# Patient Record
Sex: FEMALE | Race: ASIAN | Employment: FULL TIME | URBAN - METROPOLITAN AREA
[De-identification: names, ages, dates, MRNs, and addresses within clinical notes are randomized per-mention and may not be internally consistent; named-entity substitution may affect disease eponyms.]

---

## 2017-01-09 ENCOUNTER — APPOINTMENT (OUTPATIENT)
Dept: HEMATOLOGY/ONCOLOGY | Facility: HOSPITAL | Age: 37
End: 2017-01-09
Attending: INTERNAL MEDICINE
Payer: COMMERCIAL

## 2017-01-10 ENCOUNTER — OFFICE VISIT (OUTPATIENT)
Dept: HEMATOLOGY/ONCOLOGY | Facility: HOSPITAL | Age: 37
End: 2017-01-10
Attending: INTERNAL MEDICINE
Payer: COMMERCIAL

## 2017-01-10 VITALS
BODY MASS INDEX: 20.33 KG/M2 | WEIGHT: 122 LBS | RESPIRATION RATE: 18 BRPM | DIASTOLIC BLOOD PRESSURE: 58 MMHG | TEMPERATURE: 99 F | HEIGHT: 65 IN | SYSTOLIC BLOOD PRESSURE: 86 MMHG | HEART RATE: 76 BPM

## 2017-01-10 DIAGNOSIS — E53.8 VITAMIN B12 DEFICIENCY: ICD-10-CM

## 2017-01-10 DIAGNOSIS — D64.9 ANEMIA, UNSPECIFIED TYPE: Primary | ICD-10-CM

## 2017-01-10 PROCEDURE — 99212 OFFICE O/P EST SF 10 MIN: CPT | Performed by: INTERNAL MEDICINE

## 2017-01-10 PROCEDURE — 99214 OFFICE O/P EST MOD 30 MIN: CPT | Performed by: INTERNAL MEDICINE

## 2017-01-10 NOTE — PROGRESS NOTES
HPI     Ms. Manohar Ugalde is seen in the office for follow up regarding anemia. Pt started B12 injections after the last visit in July 2016. C/O intermittent dizziness. Upon questioning, pt states she has also been diagnosed with hypotension.  States she is very b menses). Disp:  Rfl:    cyanocobalamin 1000 MCG/ML Injection Solution Inject 1,000 mcg into the muscle every 7 days.  Disp:  Rfl:      Allergies:   No Known Allergies    Past Medical History   Diagnosis Date   • Anemia      per NextGen:  \"Anemia, unknown c oropharyngeal exudate. Eyes: Conjunctivae and EOM are normal. Pupils are equal, round, and reactive to light. Neck: Normal range of motion. Neck supple. No JVD present. No tracheal deviation present. No thyromegaly present.    Cardiovascular: Normal rat this or any previous visit (from the past 48 hour(s)). Meds This Visit:        Imaging & Referrals:  None   No orders of the defined types were placed in this encounter.

## 2017-01-11 ENCOUNTER — LAB ENCOUNTER (OUTPATIENT)
Dept: LAB | Age: 37
End: 2017-01-11
Attending: INTERNAL MEDICINE
Payer: COMMERCIAL

## 2017-01-11 DIAGNOSIS — E53.8 VITAMIN B12 DEFICIENCY: ICD-10-CM

## 2017-01-11 DIAGNOSIS — D64.9 ANEMIA, UNSPECIFIED TYPE: ICD-10-CM

## 2017-01-11 LAB
ALBUMIN SERPL BCP-MCNC: 4.1 G/DL (ref 3.5–4.8)
ALBUMIN/GLOB SERPL: 1.3 {RATIO} (ref 1–2)
ALP SERPL-CCNC: 32 U/L (ref 32–100)
ALT SERPL-CCNC: 15 U/L (ref 14–54)
ANION GAP SERPL CALC-SCNC: 7 MMOL/L (ref 0–18)
AST SERPL-CCNC: 14 U/L (ref 15–41)
BILIRUB SERPL-MCNC: 0.9 MG/DL (ref 0.3–1.2)
BUN SERPL-MCNC: 7 MG/DL (ref 8–20)
BUN/CREAT SERPL: 12.5 (ref 10–20)
CALCIUM SERPL-MCNC: 9.2 MG/DL (ref 8.5–10.5)
CHLORIDE SERPL-SCNC: 104 MMOL/L (ref 95–110)
CO2 SERPL-SCNC: 26 MMOL/L (ref 22–32)
CREAT SERPL-MCNC: 0.56 MG/DL (ref 0.5–1.5)
FERRITIN SERPL IA-MCNC: 125 NG/ML (ref 11–307)
FOLATE SERPL-MCNC: 7.4 NG/ML
GLOBULIN PLAS-MCNC: 3.2 G/DL (ref 2.5–3.7)
GLUCOSE SERPL-MCNC: 83 MG/DL (ref 70–99)
OSMOLALITY UR CALC.SUM OF ELEC: 281 MOSM/KG (ref 275–295)
POTASSIUM SERPL-SCNC: 3.6 MMOL/L (ref 3.3–5.1)
PROT SERPL-MCNC: 7.3 G/DL (ref 5.9–8.4)
SODIUM SERPL-SCNC: 137 MMOL/L (ref 136–144)
VIT B12 SERPL-MCNC: 365 PG/ML (ref 181–914)

## 2017-01-11 PROCEDURE — 82746 ASSAY OF FOLIC ACID SERUM: CPT

## 2017-01-11 PROCEDURE — 80053 COMPREHEN METABOLIC PANEL: CPT

## 2017-01-11 PROCEDURE — 82607 VITAMIN B-12: CPT

## 2017-01-11 PROCEDURE — 36415 COLL VENOUS BLD VENIPUNCTURE: CPT

## 2017-01-11 PROCEDURE — 82728 ASSAY OF FERRITIN: CPT

## 2017-01-11 PROCEDURE — 85060 BLOOD SMEAR INTERPRETATION: CPT

## 2017-01-11 PROCEDURE — 85025 COMPLETE CBC W/AUTO DIFF WBC: CPT

## 2017-01-12 LAB
BASOPHILS # BLD: 0 K/UL (ref 0–0.2)
BASOPHILS NFR BLD: 1 %
EOSINOPHIL # BLD: 0.2 K/UL (ref 0–0.7)
EOSINOPHIL NFR BLD: 2 %
ERYTHROCYTE [DISTWIDTH] IN BLOOD BY AUTOMATED COUNT: 23.4 % (ref 11–15)
HCT VFR BLD AUTO: 33.1 % (ref 35–48)
HGB BLD-MCNC: 9.5 G/DL (ref 12–16)
LYMPHOCYTES # BLD: 1.9 K/UL (ref 1–4)
LYMPHOCYTES NFR BLD: 26 %
MCH RBC QN AUTO: 16.1 PG (ref 27–32)
MCHC RBC AUTO-ENTMCNC: 28.8 G/DL (ref 32–37)
MCV RBC AUTO: 55.9 FL (ref 80–100)
MONOCYTES # BLD: 0.3 K/UL (ref 0–1)
MONOCYTES NFR BLD: 5 %
NEUTROPHILS # BLD AUTO: 4.8 K/UL (ref 1.8–7.7)
NEUTROPHILS NFR BLD: 66 %
PLATELET # BLD AUTO: 286 K/UL (ref 140–400)
PMV BLD AUTO: 10.3 FL (ref 7.4–10.3)
RBC # BLD AUTO: 5.92 M/UL (ref 3.7–5.4)
WBC # BLD AUTO: 7.2 K/UL (ref 4–11)

## 2017-01-26 ENCOUNTER — TELEPHONE (OUTPATIENT)
Dept: HEMATOLOGY/ONCOLOGY | Facility: HOSPITAL | Age: 37
End: 2017-01-26

## 2017-01-26 NOTE — TELEPHONE ENCOUNTER
Phoned pt at [phone]. She'd had a 1230 appointment scheduled for vitamin B12 injection, every 4 weeks. She said \"I forgot, can I reschedule? \"  Yes - shared the [phone] number with her. Pt states she is feeling ok today. No new issues/concerns.

## 2017-01-31 ENCOUNTER — NURSE ONLY (OUTPATIENT)
Dept: HEMATOLOGY/ONCOLOGY | Facility: HOSPITAL | Age: 37
End: 2017-01-31
Attending: INTERNAL MEDICINE
Payer: COMMERCIAL

## 2017-01-31 VITALS — TEMPERATURE: 99 F | DIASTOLIC BLOOD PRESSURE: 54 MMHG | SYSTOLIC BLOOD PRESSURE: 86 MMHG | HEART RATE: 72 BPM

## 2017-01-31 DIAGNOSIS — D50.8 OTHER IRON DEFICIENCY ANEMIA: ICD-10-CM

## 2017-01-31 DIAGNOSIS — E61.1 IRON DEFICIENCY: ICD-10-CM

## 2017-01-31 DIAGNOSIS — E53.8 VITAMIN B12 DEFICIENCY: Primary | ICD-10-CM

## 2017-01-31 PROCEDURE — 96372 THER/PROPH/DIAG INJ SC/IM: CPT

## 2017-01-31 RX ORDER — CYANOCOBALAMIN 1000 UG/ML
INJECTION INTRAMUSCULAR; SUBCUTANEOUS
Status: DISCONTINUED
Start: 2017-01-31 | End: 2017-01-31

## 2017-01-31 RX ORDER — CYANOCOBALAMIN 1000 UG/ML
1000 INJECTION INTRAMUSCULAR; SUBCUTANEOUS ONCE
Status: COMPLETED | OUTPATIENT
Start: 2017-01-31 | End: 2017-01-31

## 2017-01-31 RX ADMIN — CYANOCOBALAMIN 1000 MCG: 1000 INJECTION INTRAMUSCULAR; SUBCUTANEOUS at 10:56:00

## 2017-01-31 NOTE — PROGRESS NOTES
Patient to infusion for B 12 injection  Patient arrived ambulatory and reports fatigue  She also states that she eats little her appetite and is somewhat poor and she is tired from work .  Patient encouraged to eat smaller meals and increase fluids as well

## 2017-02-28 ENCOUNTER — NURSE ONLY (OUTPATIENT)
Dept: HEMATOLOGY/ONCOLOGY | Facility: HOSPITAL | Age: 37
End: 2017-02-28
Attending: INTERNAL MEDICINE
Payer: COMMERCIAL

## 2017-02-28 VITALS
TEMPERATURE: 99 F | HEART RATE: 57 BPM | SYSTOLIC BLOOD PRESSURE: 89 MMHG | DIASTOLIC BLOOD PRESSURE: 51 MMHG | RESPIRATION RATE: 18 BRPM

## 2017-02-28 DIAGNOSIS — D50.8 OTHER IRON DEFICIENCY ANEMIA: ICD-10-CM

## 2017-02-28 DIAGNOSIS — E53.8 VITAMIN B12 DEFICIENCY: Primary | ICD-10-CM

## 2017-02-28 DIAGNOSIS — E61.1 IRON DEFICIENCY: ICD-10-CM

## 2017-02-28 PROCEDURE — 96372 THER/PROPH/DIAG INJ SC/IM: CPT

## 2017-02-28 RX ORDER — CYANOCOBALAMIN 1000 UG/ML
INJECTION INTRAMUSCULAR; SUBCUTANEOUS
Status: COMPLETED
Start: 2017-02-28 | End: 2017-02-28

## 2017-02-28 RX ORDER — CYANOCOBALAMIN 1000 UG/ML
1000 INJECTION INTRAMUSCULAR; SUBCUTANEOUS ONCE
Status: COMPLETED | OUTPATIENT
Start: 2017-02-28 | End: 2017-02-28

## 2017-02-28 RX ADMIN — CYANOCOBALAMIN: 1000 INJECTION INTRAMUSCULAR; SUBCUTANEOUS at 13:35:00

## 2017-02-28 NOTE — PROGRESS NOTES
Pt here for monthly Vitamin B12 IM. Patient reports fatigue, states is her baseline. Vitamin B12 given IM in left deltoid. Appeared to tolerate treatment well. No S/S of adverse rxn noted at this time.   Patient discharged ambulatory with a future appoi

## 2017-03-29 ENCOUNTER — TELEPHONE (OUTPATIENT)
Dept: HEMATOLOGY/ONCOLOGY | Facility: HOSPITAL | Age: 37
End: 2017-03-29

## 2017-04-11 ENCOUNTER — TELEPHONE (OUTPATIENT)
Dept: HEMATOLOGY/ONCOLOGY | Facility: HOSPITAL | Age: 37
End: 2017-04-11

## 2017-04-11 NOTE — TELEPHONE ENCOUNTER
Pt no showed her injection on 3/28- msg left on 3/29 to call back and reschedule. Spoke with pt on 4/11- she stts she wants to keep her appt that was already scheduled for 4/25. Doesn't want to come in sooner.

## 2017-04-25 ENCOUNTER — NURSE ONLY (OUTPATIENT)
Dept: HEMATOLOGY/ONCOLOGY | Facility: HOSPITAL | Age: 37
End: 2017-04-25
Attending: INTERNAL MEDICINE
Payer: COMMERCIAL

## 2017-04-25 VITALS — SYSTOLIC BLOOD PRESSURE: 91 MMHG | HEART RATE: 66 BPM | DIASTOLIC BLOOD PRESSURE: 49 MMHG | TEMPERATURE: 99 F

## 2017-04-25 DIAGNOSIS — E53.8 VITAMIN B12 DEFICIENCY: Primary | ICD-10-CM

## 2017-04-25 DIAGNOSIS — E61.1 IRON DEFICIENCY: ICD-10-CM

## 2017-04-25 DIAGNOSIS — D50.8 OTHER IRON DEFICIENCY ANEMIA: ICD-10-CM

## 2017-04-25 PROCEDURE — 96372 THER/PROPH/DIAG INJ SC/IM: CPT

## 2017-04-25 RX ORDER — CYANOCOBALAMIN 1000 UG/ML
1000 INJECTION INTRAMUSCULAR; SUBCUTANEOUS ONCE
Status: COMPLETED | OUTPATIENT
Start: 2017-04-25 | End: 2017-04-25

## 2017-04-25 RX ORDER — CYANOCOBALAMIN 1000 UG/ML
INJECTION INTRAMUSCULAR; SUBCUTANEOUS
Status: DISCONTINUED
Start: 2017-04-25 | End: 2017-04-25

## 2017-04-25 RX ADMIN — CYANOCOBALAMIN 1000 MCG: 1000 INJECTION INTRAMUSCULAR; SUBCUTANEOUS at 13:35:00

## 2017-04-25 NOTE — PROGRESS NOTES
Patient to infusion for B 12 injection. Patient has been delayed due to her schedule and forgetting bout her appoitments  Patient arrived ambulatory and offers no complaints at this time   No anika changes voiced. B12 administered and tolerated well.  Band

## 2017-05-18 ENCOUNTER — TELEPHONE (OUTPATIENT)
Dept: HEMATOLOGY/ONCOLOGY | Facility: HOSPITAL | Age: 37
End: 2017-05-18

## 2017-05-18 NOTE — TELEPHONE ENCOUNTER
Called and spoke with patient - scheduled for follow up 5/23/17 at 11:45 with Dr. Eva Bundy. Patient has injection at 1:00 on 5/23/17 and requested that day. Encouraged to call with any questions or concerns.

## 2017-05-23 ENCOUNTER — NURSE ONLY (OUTPATIENT)
Dept: HEMATOLOGY/ONCOLOGY | Facility: HOSPITAL | Age: 37
End: 2017-05-23
Attending: INTERNAL MEDICINE
Payer: COMMERCIAL

## 2017-05-23 VITALS
RESPIRATION RATE: 16 BRPM | BODY MASS INDEX: 20.49 KG/M2 | DIASTOLIC BLOOD PRESSURE: 55 MMHG | HEART RATE: 63 BPM | TEMPERATURE: 99 F | SYSTOLIC BLOOD PRESSURE: 97 MMHG | HEIGHT: 65 IN | WEIGHT: 123 LBS

## 2017-05-23 DIAGNOSIS — E53.8 VITAMIN B12 DEFICIENCY: Primary | ICD-10-CM

## 2017-05-23 DIAGNOSIS — E61.1 IRON DEFICIENCY: ICD-10-CM

## 2017-05-23 DIAGNOSIS — D64.9 ANEMIA, UNSPECIFIED TYPE: ICD-10-CM

## 2017-05-23 DIAGNOSIS — D50.8 OTHER IRON DEFICIENCY ANEMIA: ICD-10-CM

## 2017-05-23 PROCEDURE — 83540 ASSAY OF IRON: CPT

## 2017-05-23 PROCEDURE — 99212 OFFICE O/P EST SF 10 MIN: CPT | Performed by: INTERNAL MEDICINE

## 2017-05-23 PROCEDURE — 96372 THER/PROPH/DIAG INJ SC/IM: CPT

## 2017-05-23 PROCEDURE — 85025 COMPLETE CBC W/AUTO DIFF WBC: CPT

## 2017-05-23 PROCEDURE — 82728 ASSAY OF FERRITIN: CPT

## 2017-05-23 PROCEDURE — 82607 VITAMIN B-12: CPT

## 2017-05-23 PROCEDURE — 83021 HEMOGLOBIN CHROMOTOGRAPHY: CPT

## 2017-05-23 PROCEDURE — 84466 ASSAY OF TRANSFERRIN: CPT

## 2017-05-23 PROCEDURE — 83020 HEMOGLOBIN ELECTROPHORESIS: CPT

## 2017-05-23 PROCEDURE — 99213 OFFICE O/P EST LOW 20 MIN: CPT | Performed by: INTERNAL MEDICINE

## 2017-05-23 PROCEDURE — 82746 ASSAY OF FOLIC ACID SERUM: CPT

## 2017-05-23 PROCEDURE — 36415 COLL VENOUS BLD VENIPUNCTURE: CPT

## 2017-05-23 RX ORDER — CYANOCOBALAMIN 1000 UG/ML
1000 INJECTION INTRAMUSCULAR; SUBCUTANEOUS ONCE
Status: COMPLETED | OUTPATIENT
Start: 2017-05-23 | End: 2017-05-23

## 2017-05-23 RX ORDER — CYANOCOBALAMIN 1000 UG/ML
INJECTION INTRAMUSCULAR; SUBCUTANEOUS
Status: DISCONTINUED
Start: 2017-05-23 | End: 2017-05-23

## 2017-05-23 RX ADMIN — CYANOCOBALAMIN 1000 MCG: 1000 INJECTION INTRAMUSCULAR; SUBCUTANEOUS at 13:30:00

## 2017-05-23 NOTE — PROGRESS NOTES
Cancer Center Progress Note    Patient Name: Rubi Nesbitt   YOB: 1980   Medical Record Number: [id number]   Attending Physician: Fartun Estrada M.D.      Chief Complaint:  Microcytic anemia, B12 deficiency    History of Present Illness:  35-yea Children: N/A     Occupational History  None on file     Social History Main Topics   Smoking status: Never Smoker     Smokeless tobacco: Never Used    Alcohol Use: Yes  0.0 oz/week    0 Standard drinks or equivalent per week         Comment: occasionally 14* 01/11/2017   ALT 15 01/11/2017   ALKPHOS 33 07/14/2016   BILT 0.9 01/11/2017   TP 7.3 01/11/2017   ALB 4.1 01/11/2017   GLOBULIN 3.2 01/11/2017   AGRATIO 1.3 07/14/2016    01/11/2017   K 3.6 01/11/2017    01/11/2017   CO2 26 01/11/2017

## 2017-05-23 NOTE — PROGRESS NOTES
Received patient from Dr Madan Russell visit. Orders received to obtain lab draw today. On 1 attempt #23g butterfly inserted right ac.  Cbc, Y94, Ferriton, Folic Acid and hemoglobinopathy profile obtained and sent to lab. Gauze and Coban to site.   Vitamin B 12 g

## 2017-06-20 ENCOUNTER — NURSE ONLY (OUTPATIENT)
Dept: HEMATOLOGY/ONCOLOGY | Facility: HOSPITAL | Age: 37
End: 2017-06-20
Attending: INTERNAL MEDICINE
Payer: COMMERCIAL

## 2017-06-20 VITALS
DIASTOLIC BLOOD PRESSURE: 50 MMHG | TEMPERATURE: 99 F | HEART RATE: 60 BPM | SYSTOLIC BLOOD PRESSURE: 89 MMHG | RESPIRATION RATE: 16 BRPM

## 2017-06-20 DIAGNOSIS — E61.1 IRON DEFICIENCY: ICD-10-CM

## 2017-06-20 DIAGNOSIS — E53.8 VITAMIN B12 DEFICIENCY: Primary | ICD-10-CM

## 2017-06-20 DIAGNOSIS — D50.8 OTHER IRON DEFICIENCY ANEMIA: ICD-10-CM

## 2017-06-20 PROCEDURE — 96372 THER/PROPH/DIAG INJ SC/IM: CPT

## 2017-06-20 RX ORDER — CYANOCOBALAMIN 1000 UG/ML
1000 INJECTION INTRAMUSCULAR; SUBCUTANEOUS ONCE
Status: COMPLETED | OUTPATIENT
Start: 2017-06-20 | End: 2017-06-20

## 2017-06-20 RX ORDER — CYANOCOBALAMIN 1000 UG/ML
INJECTION INTRAMUSCULAR; SUBCUTANEOUS
Status: COMPLETED
Start: 2017-06-20 | End: 2017-06-20

## 2017-06-20 RX ADMIN — CYANOCOBALAMIN 1000 MCG: 1000 INJECTION INTRAMUSCULAR; SUBCUTANEOUS at 12:30:00

## 2017-06-20 NOTE — PROGRESS NOTES
Patient here for B12 injection. Ambulated from waiting room with steady gait. VS stable,  Injection given IM to left upper arm. Band-aid applied to injection site. Patient escorted to appointment desk to make appointment for 4 weeks.

## 2017-07-20 ENCOUNTER — NURSE ONLY (OUTPATIENT)
Dept: HEMATOLOGY/ONCOLOGY | Facility: HOSPITAL | Age: 37
End: 2017-07-20
Attending: INTERNAL MEDICINE
Payer: COMMERCIAL

## 2017-07-20 DIAGNOSIS — E53.8 VITAMIN B12 DEFICIENCY: Primary | ICD-10-CM

## 2017-07-20 DIAGNOSIS — E61.1 IRON DEFICIENCY: ICD-10-CM

## 2017-07-20 DIAGNOSIS — D50.8 OTHER IRON DEFICIENCY ANEMIA: ICD-10-CM

## 2017-07-20 PROCEDURE — 96372 THER/PROPH/DIAG INJ SC/IM: CPT

## 2017-07-20 RX ORDER — CYANOCOBALAMIN 1000 UG/ML
1000 INJECTION INTRAMUSCULAR; SUBCUTANEOUS ONCE
Status: CANCELLED | OUTPATIENT
Start: 2017-07-20

## 2017-07-20 RX ORDER — CYANOCOBALAMIN 1000 UG/ML
INJECTION INTRAMUSCULAR; SUBCUTANEOUS
Status: COMPLETED
Start: 2017-07-20 | End: 2017-07-20

## 2017-07-20 RX ORDER — CYANOCOBALAMIN 1000 UG/ML
1000 INJECTION INTRAMUSCULAR; SUBCUTANEOUS ONCE
Status: COMPLETED | OUTPATIENT
Start: 2017-07-20 | End: 2017-07-20

## 2017-07-20 RX ADMIN — CYANOCOBALAMIN 1000 MCG: 1000 INJECTION INTRAMUSCULAR; SUBCUTANEOUS at 12:27:00

## 2017-07-20 NOTE — PROGRESS NOTES
Patient arrives for monthly B12. Reports she is feeling well, denies any complaints. B12 injected into left upper arm, patient tolerated well. Patient discharged in stable condition with future appointments scheduled.

## 2017-08-17 ENCOUNTER — NURSE ONLY (OUTPATIENT)
Dept: HEMATOLOGY/ONCOLOGY | Facility: HOSPITAL | Age: 37
End: 2017-08-17
Attending: INTERNAL MEDICINE
Payer: COMMERCIAL

## 2017-08-17 DIAGNOSIS — D50.8 OTHER IRON DEFICIENCY ANEMIA: ICD-10-CM

## 2017-08-17 DIAGNOSIS — E61.1 IRON DEFICIENCY: ICD-10-CM

## 2017-08-17 DIAGNOSIS — E53.8 VITAMIN B12 DEFICIENCY: Primary | ICD-10-CM

## 2017-08-17 PROCEDURE — 96372 THER/PROPH/DIAG INJ SC/IM: CPT

## 2017-08-17 RX ORDER — CYANOCOBALAMIN 1000 UG/ML
INJECTION INTRAMUSCULAR; SUBCUTANEOUS
Status: COMPLETED
Start: 2017-08-17 | End: 2017-08-17

## 2017-08-17 RX ORDER — CYANOCOBALAMIN 1000 UG/ML
1000 INJECTION INTRAMUSCULAR; SUBCUTANEOUS ONCE
Status: CANCELLED | OUTPATIENT
Start: 2017-08-17

## 2017-08-17 RX ORDER — CYANOCOBALAMIN 1000 UG/ML
1000 INJECTION INTRAMUSCULAR; SUBCUTANEOUS ONCE
Status: COMPLETED | OUTPATIENT
Start: 2017-08-17 | End: 2017-08-17

## 2017-08-17 RX ADMIN — CYANOCOBALAMIN 1000 MCG: 1000 INJECTION INTRAMUSCULAR; SUBCUTANEOUS at 12:45:00

## 2017-08-17 NOTE — PROGRESS NOTES
Pt here for monthly Vitamin B12 IM. Patient reports feeling well. States that she was driving the other day et had some dizziness while driving, states she was able to pull over et it resolve in about 2 minutes, states that it hasn't happened since.   Vit

## 2017-09-06 ENCOUNTER — APPOINTMENT (OUTPATIENT)
Dept: HEMATOLOGY/ONCOLOGY | Facility: HOSPITAL | Age: 37
End: 2017-09-06
Attending: INTERNAL MEDICINE
Payer: COMMERCIAL

## 2017-09-08 ENCOUNTER — OFFICE VISIT (OUTPATIENT)
Dept: HEMATOLOGY/ONCOLOGY | Facility: HOSPITAL | Age: 37
End: 2017-09-08
Attending: INTERNAL MEDICINE
Payer: COMMERCIAL

## 2017-09-08 VITALS
WEIGHT: 119 LBS | DIASTOLIC BLOOD PRESSURE: 55 MMHG | SYSTOLIC BLOOD PRESSURE: 91 MMHG | TEMPERATURE: 98 F | BODY MASS INDEX: 19.83 KG/M2 | HEIGHT: 65 IN | HEART RATE: 67 BPM | RESPIRATION RATE: 16 BRPM

## 2017-09-08 DIAGNOSIS — D64.9 ANEMIA, UNSPECIFIED TYPE: ICD-10-CM

## 2017-09-08 DIAGNOSIS — E53.8 VITAMIN B12 DEFICIENCY: Primary | ICD-10-CM

## 2017-09-08 PROCEDURE — 99212 OFFICE O/P EST SF 10 MIN: CPT | Performed by: INTERNAL MEDICINE

## 2017-09-08 PROCEDURE — 99213 OFFICE O/P EST LOW 20 MIN: CPT | Performed by: INTERNAL MEDICINE

## 2017-09-08 RX ORDER — CYANOCOBALAMIN 1000 UG/ML
1000 INJECTION INTRAMUSCULAR; SUBCUTANEOUS ONCE
Status: CANCELLED | OUTPATIENT
Start: 2017-09-14

## 2017-09-08 NOTE — PROGRESS NOTES
Cancer Center Progress Note    Patient Name: Giorgi Griffin   YOB: 1980   Medical Record Number: [id number]   Attending Physician: Susana Gómez M.D.      Chief Complaint:  Microcytic anemia, B12 deficiency    History of Present Illness:  35-yea name: N/A    Years of education: N/A  Number of children: N/A     Occupational History  None on file     Social History Main Topics   Smoking status: Never Smoker    Smokeless tobacco: Never Used    Alcohol use Yes  0.0 oz/week     Comment: occasionally OSMOCALC 281 01/11/2017   ALKPHO 32 01/11/2017   AST 14 (L) 01/11/2017   ALT 15 01/11/2017   ALKPHOS 33 07/14/2016   BILT 0.9 01/11/2017   TP 7.3 01/11/2017   ALB 4.1 01/11/2017   GLOBULIN 3.2 01/11/2017   AGRATIO 1.3 07/14/2016    01/11/2017   K 3

## 2017-09-14 ENCOUNTER — NURSE ONLY (OUTPATIENT)
Dept: HEMATOLOGY/ONCOLOGY | Facility: HOSPITAL | Age: 37
End: 2017-09-14
Attending: INTERNAL MEDICINE
Payer: COMMERCIAL

## 2017-09-14 VITALS
DIASTOLIC BLOOD PRESSURE: 53 MMHG | TEMPERATURE: 99 F | HEART RATE: 60 BPM | RESPIRATION RATE: 16 BRPM | SYSTOLIC BLOOD PRESSURE: 90 MMHG

## 2017-09-14 DIAGNOSIS — D64.9 ANEMIA, UNSPECIFIED TYPE: ICD-10-CM

## 2017-09-14 DIAGNOSIS — E61.1 IRON DEFICIENCY: ICD-10-CM

## 2017-09-14 DIAGNOSIS — E53.8 VITAMIN B12 DEFICIENCY: Primary | ICD-10-CM

## 2017-09-14 LAB
BASOPHILS # BLD: 0 K/UL (ref 0–0.2)
BASOPHILS NFR BLD: 0 %
EOSINOPHIL # BLD: 0.1 K/UL (ref 0–0.7)
EOSINOPHIL NFR BLD: 2 %
ERYTHROCYTE [DISTWIDTH] IN BLOOD BY AUTOMATED COUNT: 22.8 % (ref 11–15)
HCT VFR BLD AUTO: 30.5 % (ref 35–48)
HGB BLD-MCNC: 8.9 G/DL (ref 12–16)
LYMPHOCYTES # BLD: 1.6 K/UL (ref 1–4)
LYMPHOCYTES NFR BLD: 28 %
MCH RBC QN AUTO: 16.2 PG (ref 27–32)
MCHC RBC AUTO-ENTMCNC: 29.2 G/DL (ref 32–37)
MCV RBC AUTO: 55.3 FL (ref 80–100)
MONOCYTES # BLD: 0.3 K/UL (ref 0–1)
MONOCYTES NFR BLD: 6 %
NEUTROPHILS # BLD AUTO: 3.6 K/UL (ref 1.8–7.7)
NEUTROPHILS NFR BLD: 64 %
PLATELET # BLD AUTO: 238 K/UL (ref 140–400)
PMV BLD AUTO: 9.6 FL (ref 7.4–10.3)
RBC # BLD AUTO: 5.52 M/UL (ref 3.7–5.4)
VIT B12 SERPL-MCNC: >1500 PG/ML (ref 181–914)
WBC # BLD AUTO: 5.7 K/UL (ref 4–11)

## 2017-09-14 PROCEDURE — 36415 COLL VENOUS BLD VENIPUNCTURE: CPT

## 2017-09-14 PROCEDURE — 96372 THER/PROPH/DIAG INJ SC/IM: CPT

## 2017-09-14 PROCEDURE — 82607 VITAMIN B-12: CPT

## 2017-09-14 PROCEDURE — 85025 COMPLETE CBC W/AUTO DIFF WBC: CPT

## 2017-09-14 RX ORDER — CYANOCOBALAMIN 1000 UG/ML
1000 INJECTION INTRAMUSCULAR; SUBCUTANEOUS ONCE
Status: DISCONTINUED | OUTPATIENT
Start: 2017-09-14 | End: 2017-09-14

## 2017-09-14 RX ORDER — CYANOCOBALAMIN 1000 UG/ML
1000 INJECTION INTRAMUSCULAR; SUBCUTANEOUS ONCE
Status: CANCELLED | OUTPATIENT
Start: 2017-09-14

## 2017-09-14 RX ORDER — CYANOCOBALAMIN 1000 UG/ML
INJECTION INTRAMUSCULAR; SUBCUTANEOUS
Status: DISCONTINUED
Start: 2017-09-14 | End: 2017-09-14

## 2017-09-14 NOTE — PROGRESS NOTES
Patient here for Vitamin B 12 injection. Patient requested Lab draw, saw Dr Kimberly Gar on 9/8/17. Lab orders noted in order inquiry. On 1 attempt #23g butterfly inserted right ac.  Cbc,b12 obtained and sent. Gauze and coban to site.     B12 given IM in left de

## 2017-10-12 ENCOUNTER — NURSE ONLY (OUTPATIENT)
Dept: HEMATOLOGY/ONCOLOGY | Facility: HOSPITAL | Age: 37
End: 2017-10-12
Attending: INTERNAL MEDICINE
Payer: COMMERCIAL

## 2017-10-12 VITALS
DIASTOLIC BLOOD PRESSURE: 57 MMHG | TEMPERATURE: 99 F | SYSTOLIC BLOOD PRESSURE: 89 MMHG | RESPIRATION RATE: 16 BRPM | HEART RATE: 56 BPM

## 2017-10-12 DIAGNOSIS — D64.9 ANEMIA, UNSPECIFIED TYPE: ICD-10-CM

## 2017-10-12 DIAGNOSIS — E61.1 IRON DEFICIENCY: ICD-10-CM

## 2017-10-12 DIAGNOSIS — E53.8 VITAMIN B12 DEFICIENCY: Primary | ICD-10-CM

## 2017-10-12 PROCEDURE — 96372 THER/PROPH/DIAG INJ SC/IM: CPT

## 2017-10-12 RX ORDER — CYANOCOBALAMIN 1000 UG/ML
1000 INJECTION INTRAMUSCULAR; SUBCUTANEOUS ONCE
Status: COMPLETED | OUTPATIENT
Start: 2017-10-12 | End: 2017-10-12

## 2017-10-12 RX ORDER — CYANOCOBALAMIN 1000 UG/ML
1000 INJECTION INTRAMUSCULAR; SUBCUTANEOUS ONCE
Status: CANCELLED | OUTPATIENT
Start: 2017-10-12

## 2017-10-12 RX ORDER — CYANOCOBALAMIN 1000 UG/ML
INJECTION INTRAMUSCULAR; SUBCUTANEOUS
Status: COMPLETED
Start: 2017-10-12 | End: 2017-10-12

## 2017-10-12 RX ADMIN — CYANOCOBALAMIN 1000 MCG: 1000 INJECTION INTRAMUSCULAR; SUBCUTANEOUS at 12:25:00

## 2017-10-12 NOTE — PROGRESS NOTES
Patient arrives for  B12 injection, per Dr. Dain Powers order, Vitamin B12 is ordered weekly times 4, first week given today, patient is scheduled weekly for next three weeks and then changing back to monthly. Reports she is feeling well, complaints of fatigue.

## 2017-10-20 ENCOUNTER — NURSE ONLY (OUTPATIENT)
Dept: HEMATOLOGY/ONCOLOGY | Facility: HOSPITAL | Age: 37
End: 2017-10-20
Attending: INTERNAL MEDICINE
Payer: COMMERCIAL

## 2017-10-20 VITALS — TEMPERATURE: 98 F | HEART RATE: 67 BPM | SYSTOLIC BLOOD PRESSURE: 94 MMHG | DIASTOLIC BLOOD PRESSURE: 57 MMHG

## 2017-10-20 DIAGNOSIS — E53.8 VITAMIN B12 DEFICIENCY: Primary | ICD-10-CM

## 2017-10-20 DIAGNOSIS — D64.9 ANEMIA, UNSPECIFIED TYPE: ICD-10-CM

## 2017-10-20 DIAGNOSIS — E61.1 IRON DEFICIENCY: ICD-10-CM

## 2017-10-20 PROCEDURE — 96372 THER/PROPH/DIAG INJ SC/IM: CPT

## 2017-10-20 RX ORDER — CYANOCOBALAMIN 1000 UG/ML
1000 INJECTION INTRAMUSCULAR; SUBCUTANEOUS ONCE
Status: CANCELLED | OUTPATIENT
Start: 2017-10-20

## 2017-10-20 RX ORDER — CYANOCOBALAMIN 1000 UG/ML
1000 INJECTION INTRAMUSCULAR; SUBCUTANEOUS ONCE
Status: COMPLETED | OUTPATIENT
Start: 2017-10-20 | End: 2017-10-20

## 2017-10-20 RX ORDER — CYANOCOBALAMIN 1000 UG/ML
INJECTION INTRAMUSCULAR; SUBCUTANEOUS
Status: COMPLETED
Start: 2017-10-20 | End: 2017-10-20

## 2017-10-20 RX ADMIN — CYANOCOBALAMIN 1000 MCG: 1000 INJECTION INTRAMUSCULAR; SUBCUTANEOUS at 14:45:00

## 2017-10-20 NOTE — PROGRESS NOTES
Patient here for B12 injection. Ambulated from waiting room with steady gait. Patient states she is having occasional dizziness when getting up from sitting position.   Usually in the evening around 8 pm discussed her fluid intake discussed if she was drin

## 2017-10-25 ENCOUNTER — TELEPHONE (OUTPATIENT)
Dept: HEMATOLOGY/ONCOLOGY | Facility: HOSPITAL | Age: 37
End: 2017-10-25

## 2017-10-26 ENCOUNTER — APPOINTMENT (OUTPATIENT)
Dept: HEMATOLOGY/ONCOLOGY | Facility: HOSPITAL | Age: 37
End: 2017-10-26
Attending: FAMILY MEDICINE
Payer: COMMERCIAL

## 2017-10-27 ENCOUNTER — TELEPHONE (OUTPATIENT)
Dept: HEMATOLOGY/ONCOLOGY | Facility: HOSPITAL | Age: 37
End: 2017-10-27

## 2017-10-27 ENCOUNTER — APPOINTMENT (OUTPATIENT)
Dept: HEMATOLOGY/ONCOLOGY | Facility: HOSPITAL | Age: 37
End: 2017-10-27
Attending: INTERNAL MEDICINE
Payer: COMMERCIAL

## 2017-10-27 NOTE — TELEPHONE ENCOUNTER
Sugey Sommer per referral from RN [address], [phone] regarding patient's questions/concerns r/t insurance.     ROXANNE FigueroaW

## 2017-11-03 ENCOUNTER — APPOINTMENT (OUTPATIENT)
Dept: HEMATOLOGY/ONCOLOGY | Facility: HOSPITAL | Age: 37
End: 2017-11-03
Attending: INTERNAL MEDICINE
Payer: COMMERCIAL

## 2017-11-14 ENCOUNTER — TELEPHONE (OUTPATIENT)
Dept: HEMATOLOGY/ONCOLOGY | Facility: HOSPITAL | Age: 37
End: 2017-11-14

## 2017-11-14 NOTE — TELEPHONE ENCOUNTER
ZAKI LVMTCB, reaching out per referral from RN regarding Insurance concern. Second attempt.      Reyes Churn, LCSW

## 2017-11-24 ENCOUNTER — APPOINTMENT (OUTPATIENT)
Dept: HEMATOLOGY/ONCOLOGY | Facility: HOSPITAL | Age: 37
End: 2017-11-24
Attending: INTERNAL MEDICINE
Payer: COMMERCIAL

## 2017-12-01 ENCOUNTER — APPOINTMENT (OUTPATIENT)
Dept: HEMATOLOGY/ONCOLOGY | Facility: HOSPITAL | Age: 37
End: 2017-12-01
Attending: INTERNAL MEDICINE
Payer: COMMERCIAL

## 2018-02-22 ENCOUNTER — APPOINTMENT (OUTPATIENT)
Dept: GENERAL RADIOLOGY | Facility: HOSPITAL | Age: 38
End: 2018-02-22
Payer: COMMERCIAL

## 2018-02-22 ENCOUNTER — HOSPITAL ENCOUNTER (EMERGENCY)
Facility: HOSPITAL | Age: 38
Discharge: HOME | End: 2018-02-22
Payer: COMMERCIAL

## 2018-02-22 ENCOUNTER — NURSE TRIAGE (OUTPATIENT)
Dept: OTHER | Age: 38
End: 2018-02-22

## 2018-02-22 ENCOUNTER — APPOINTMENT (OUTPATIENT)
Dept: CT IMAGING | Facility: HOSPITAL | Age: 38
End: 2018-02-22
Attending: NURSE PRACTITIONER
Payer: COMMERCIAL

## 2018-02-22 VITALS
SYSTOLIC BLOOD PRESSURE: 89 MMHG | HEART RATE: 65 BPM | HEIGHT: 66 IN | WEIGHT: 118 LBS | RESPIRATION RATE: 16 BRPM | TEMPERATURE: 100 F | OXYGEN SATURATION: 99 % | BODY MASS INDEX: 18.96 KG/M2 | DIASTOLIC BLOOD PRESSURE: 64 MMHG

## 2018-02-22 DIAGNOSIS — R07.89 CHEST PAIN, NON-CARDIAC: Primary | ICD-10-CM

## 2018-02-22 LAB
ANION GAP SERPL CALC-SCNC: 6 MMOL/L (ref 0–18)
B-HCG UR QL: NEGATIVE
BACTERIA UR QL AUTO: NEGATIVE /HPF
BASOPHILS # BLD: 0 K/UL (ref 0–0.2)
BASOPHILS NFR BLD: 0 %
BILIRUB UR QL: NEGATIVE
BUN SERPL-MCNC: 6 MG/DL (ref 8–20)
BUN/CREAT SERPL: 11.1 (ref 10–20)
CALCIUM SERPL-MCNC: 9 MG/DL (ref 8.5–10.5)
CHLORIDE SERPL-SCNC: 107 MMOL/L (ref 95–110)
CLARITY UR: CLEAR
CO2 SERPL-SCNC: 25 MMOL/L (ref 22–32)
COLOR UR: YELLOW
CREAT SERPL-MCNC: 0.54 MG/DL (ref 0.5–1.5)
D DIMER PPP FEU-MCNC: 0.34 MCG/ML (ref ?–0.5)
EOSINOPHIL # BLD: 0.1 K/UL (ref 0–0.7)
EOSINOPHIL NFR BLD: 2 %
ERYTHROCYTE [DISTWIDTH] IN BLOOD BY AUTOMATED COUNT: 23 % (ref 11–15)
GLUCOSE SERPL-MCNC: 98 MG/DL (ref 70–99)
GLUCOSE UR-MCNC: NEGATIVE MG/DL
HCT VFR BLD AUTO: 29.6 % (ref 35–48)
HGB BLD-MCNC: 9.3 G/DL (ref 12–16)
KETONES UR-MCNC: NEGATIVE MG/DL
LEUKOCYTE ESTERASE UR QL STRIP.AUTO: NEGATIVE
LYMPHOCYTES # BLD: 1.5 K/UL (ref 1–4)
LYMPHOCYTES NFR BLD: 24 %
MCH RBC QN AUTO: 17.5 PG (ref 27–32)
MCHC RBC AUTO-ENTMCNC: 31.4 G/DL (ref 32–37)
MCV RBC AUTO: 55.8 FL (ref 80–100)
MONOCYTES # BLD: 0.3 K/UL (ref 0–1)
MONOCYTES NFR BLD: 6 %
NEUTROPHILS # BLD AUTO: 4.1 K/UL (ref 1.8–7.7)
NEUTROPHILS NFR BLD: 68 %
NITRITE UR QL STRIP.AUTO: NEGATIVE
OSMOLALITY UR CALC.SUM OF ELEC: 284 MOSM/KG (ref 275–295)
PH UR: 7 [PH] (ref 5–8)
PLATELET # BLD AUTO: 228 K/UL (ref 140–400)
PMV BLD AUTO: 9.2 FL (ref 7.4–10.3)
POTASSIUM SERPL-SCNC: 3 MMOL/L (ref 3.3–5.1)
PROT UR-MCNC: NEGATIVE MG/DL
RBC # BLD AUTO: 5.3 M/UL (ref 3.7–5.4)
RBC #/AREA URNS AUTO: 2 /HPF
SODIUM SERPL-SCNC: 138 MMOL/L (ref 136–144)
SP GR UR STRIP: 1.01 (ref 1–1.03)
TROPONIN I SERPL-MCNC: 0 NG/ML (ref ?–0.03)
UROBILINOGEN UR STRIP-ACNC: <2
VIT C UR-MCNC: NEGATIVE MG/DL
WBC # BLD AUTO: 6 K/UL (ref 4–11)
WBC #/AREA URNS AUTO: <1 /HPF

## 2018-02-22 PROCEDURE — 81001 URINALYSIS AUTO W/SCOPE: CPT

## 2018-02-22 PROCEDURE — 71260 CT THORAX DX C+: CPT | Performed by: NURSE PRACTITIONER

## 2018-02-22 PROCEDURE — 96374 THER/PROPH/DIAG INJ IV PUSH: CPT

## 2018-02-22 PROCEDURE — 99285 EMERGENCY DEPT VISIT HI MDM: CPT

## 2018-02-22 PROCEDURE — 96375 TX/PRO/DX INJ NEW DRUG ADDON: CPT

## 2018-02-22 PROCEDURE — 80048 BASIC METABOLIC PNL TOTAL CA: CPT

## 2018-02-22 PROCEDURE — 71045 X-RAY EXAM CHEST 1 VIEW: CPT

## 2018-02-22 PROCEDURE — 84484 ASSAY OF TROPONIN QUANT: CPT

## 2018-02-22 PROCEDURE — 93010 ELECTROCARDIOGRAM REPORT: CPT | Performed by: INTERNAL MEDICINE

## 2018-02-22 PROCEDURE — 85025 COMPLETE CBC W/AUTO DIFF WBC: CPT

## 2018-02-22 PROCEDURE — 81025 URINE PREGNANCY TEST: CPT

## 2018-02-22 PROCEDURE — 93005 ELECTROCARDIOGRAM TRACING: CPT

## 2018-02-22 PROCEDURE — 85379 FIBRIN DEGRADATION QUANT: CPT | Performed by: NURSE PRACTITIONER

## 2018-02-22 RX ORDER — MORPHINE SULFATE 2 MG/ML
2 INJECTION, SOLUTION INTRAMUSCULAR; INTRAVENOUS ONCE
Status: COMPLETED | OUTPATIENT
Start: 2018-02-22 | End: 2018-02-22

## 2018-02-22 RX ORDER — KETOROLAC TROMETHAMINE 30 MG/ML
30 INJECTION, SOLUTION INTRAMUSCULAR; INTRAVENOUS ONCE
Status: COMPLETED | OUTPATIENT
Start: 2018-02-22 | End: 2018-02-22

## 2018-02-22 NOTE — ED PROVIDER NOTES
Patient Seen in: [facility] Emergency Department    History   CC: sob  HPI: Tracie Guardado 41year old female  who presents to the ER c/o midsternal chest pain as well as shortness of breath that is intermittent for the past approximately 1 month systems reviewed and negative except as noted above. PSFH elements reviewed from today and agreed except as otherwise stated in HPI. Medications :   Naproxen Sodium (ALEVE) 220 MG Oral Cap,  Take 220 mg by mouth as needed.    ibuprofen 100 MG/5ML Oral following:        Result Value    Potassium 3.0 (*)     BUN 6 (*)     All other components within normal limits   URINALYSIS WITH CULTURE REFLEX - Abnormal; Notable for the following:     Blood Urine Small (*)     All other components within normal limits Patient demonstrates understanding of all instruction and agrees with plan of care.       Disposition and Plan     Clinical Impression:  Chest pain, non-cardiac  (primary encounter diagnosis)    Disposition:  Discharge    Follow-up:  Lelo Her,

## 2018-02-22 NOTE — TELEPHONE ENCOUNTER
Action Requested: Summary for Provider     []  Critical Lab, Recommendations Needed  [] Need Additional Advice  [x]   FYI    []   Need Orders  [] Need Medications Sent to Pharmacy  []  Other     SUMMARY: on and off sharp chest pain left side of chest, shor

## 2018-02-22 NOTE — ED NOTES
Patient arrives with complaints of CP and SOB for the past month, and worsening symptoms today. Patient states CP radiates to left shoulder and down arm. Per pt, CP increases with deep breaths, but CP is always constant. Denies N/V/D/F.  Denies recent trave

## 2018-02-22 NOTE — ED INITIAL ASSESSMENT (HPI)
Pt presents with SOB and left-sided chest pain that radiates to the back x1 month. Pt states pain has worsened today. Took Advil with no relief. Denies N/V. Pain worse with deep inhalation.

## 2018-02-26 NOTE — TELEPHONE ENCOUNTER
Patient contacted. ER f/u:  chart reviewed with the patient; CT scan and CXR neg ;patient is to f/u with PCP for f/u appt. Patient states she still has some shoulder pain; Also noticed a clear fluid filled blister on sole of foot.  Feels some pain with wa

## 2018-02-27 ENCOUNTER — TELEPHONE (OUTPATIENT)
Dept: FAMILY MEDICINE CLINIC | Facility: CLINIC | Age: 38
End: 2018-02-27

## 2018-02-27 ENCOUNTER — OFFICE VISIT (OUTPATIENT)
Dept: FAMILY MEDICINE CLINIC | Facility: CLINIC | Age: 38
End: 2018-02-27

## 2018-02-27 VITALS
DIASTOLIC BLOOD PRESSURE: 62 MMHG | SYSTOLIC BLOOD PRESSURE: 91 MMHG | BODY MASS INDEX: 19.99 KG/M2 | HEART RATE: 76 BPM | HEIGHT: 65 IN | WEIGHT: 120 LBS

## 2018-02-27 DIAGNOSIS — K13.0 ANGULAR CHEILITIS: ICD-10-CM

## 2018-02-27 DIAGNOSIS — S29.019A THORACIC MYOFASCIAL STRAIN, INITIAL ENCOUNTER: ICD-10-CM

## 2018-02-27 DIAGNOSIS — B07.0 PLANTAR WART: Primary | ICD-10-CM

## 2018-02-27 PROCEDURE — 99213 OFFICE O/P EST LOW 20 MIN: CPT | Performed by: FAMILY MEDICINE

## 2018-02-27 PROCEDURE — 99212 OFFICE O/P EST SF 10 MIN: CPT | Performed by: FAMILY MEDICINE

## 2018-02-27 RX ORDER — CLOTRIMAZOLE 1 %
1 CREAM (GRAM) TOPICAL 2 TIMES DAILY
Qty: 1 TUBE | Refills: 1 | Status: SHIPPED | OUTPATIENT
Start: 2018-02-27 | End: 2018-08-01

## 2018-02-27 NOTE — TELEPHONE ENCOUNTER
Called patient  And states that she saw Dr Michaela Dumas earlier and was about to leave when she asked him about any medication that she can use for her mouth sore, advised that will send the message to MD and  Will call her back, verbalized understanding.     Yamini Read

## 2018-02-27 NOTE — PROGRESS NOTES
Blood pressure 91/62, pulse 76, height 5' 5\" (1.651 m), weight 120 lb (54.4 kg). Patient presents today complaining of a one-week history of mouth sores on both sides. She also complains of a lump on the bottom of her foot.   Also with some shoulder pa

## 2018-02-27 NOTE — PATIENT INSTRUCTIONS
Use PUMISCE STONE EVERY NIGHT SHAVE WART AREA    APPLY SALICYLIC ACID 17 % AFTER PUMISCE STONE (COMPOUND W OR DUOFILM)

## 2018-02-27 NOTE — TELEPHONE ENCOUNTER
Pt called to clarify which medication Dr. Yasmeen Hobbs mentioned for blisters for her mouth. She is at the pharmacy and knows that there was a script sent over, but states that there was another cream but cannot remember the name and is awaiting instructions.

## 2018-02-28 NOTE — TELEPHONE ENCOUNTER
Called patient and advised about the rx that was send, patient getting confused with the medications, advised that the clotrimazole cream is for the mouth sore and the compound W is for the foot, verbalized understanding.

## 2018-03-06 ENCOUNTER — OFFICE VISIT (OUTPATIENT)
Dept: FAMILY MEDICINE CLINIC | Facility: CLINIC | Age: 38
End: 2018-03-06

## 2018-03-06 VITALS
HEART RATE: 68 BPM | HEIGHT: 65 IN | BODY MASS INDEX: 20.16 KG/M2 | DIASTOLIC BLOOD PRESSURE: 68 MMHG | WEIGHT: 121 LBS | SYSTOLIC BLOOD PRESSURE: 95 MMHG

## 2018-03-06 DIAGNOSIS — S29.019A THORACIC MYOFASCIAL STRAIN, INITIAL ENCOUNTER: Primary | ICD-10-CM

## 2018-03-06 PROCEDURE — 99212 OFFICE O/P EST SF 10 MIN: CPT | Performed by: FAMILY MEDICINE

## 2018-03-06 PROCEDURE — 99213 OFFICE O/P EST LOW 20 MIN: CPT | Performed by: FAMILY MEDICINE

## 2018-03-06 RX ORDER — NAPROXEN 500 MG/1
500 TABLET ORAL 2 TIMES DAILY WITH MEALS
Qty: 60 TABLET | Refills: 1 | Status: SHIPPED | OUTPATIENT
Start: 2018-03-06 | End: 2018-08-01

## 2018-03-14 NOTE — PROGRESS NOTES
Blood pressure 95/68, pulse 68, height 5' 5\" (1.651 m), weight 121 lb (54.9 kg). Patient presents today complaining of continued left shoulder pain did have some relief with manipulation previously.   She also reports that she has not had any traumatic

## 2018-08-01 ENCOUNTER — HOSPITAL ENCOUNTER (OUTPATIENT)
Age: 38
Discharge: HOME | End: 2018-08-01
Attending: EMERGENCY MEDICINE
Payer: COMMERCIAL

## 2018-08-01 ENCOUNTER — NURSE TRIAGE (OUTPATIENT)
Dept: FAMILY MEDICINE CLINIC | Facility: CLINIC | Age: 38
End: 2018-08-01

## 2018-08-01 VITALS
WEIGHT: 118 LBS | RESPIRATION RATE: 20 BRPM | BODY MASS INDEX: 18.96 KG/M2 | HEIGHT: 66 IN | OXYGEN SATURATION: 100 % | TEMPERATURE: 98 F | SYSTOLIC BLOOD PRESSURE: 99 MMHG | HEART RATE: 64 BPM | DIASTOLIC BLOOD PRESSURE: 57 MMHG

## 2018-08-01 DIAGNOSIS — M43.6 TORTICOLLIS: Primary | ICD-10-CM

## 2018-08-01 PROCEDURE — 99213 OFFICE O/P EST LOW 20 MIN: CPT

## 2018-08-01 PROCEDURE — 99214 OFFICE O/P EST MOD 30 MIN: CPT

## 2018-08-01 RX ORDER — MELOXICAM 7.5 MG/1
TABLET ORAL
Qty: 10 TABLET | Refills: 0 | Status: SHIPPED | OUTPATIENT
Start: 2018-08-01 | End: 2018-08-17

## 2018-08-01 RX ORDER — METAXALONE 800 MG/1
800 TABLET ORAL EVERY 8 HOURS PRN
Qty: 20 TABLET | Refills: 0 | Status: SHIPPED | OUTPATIENT
Start: 2018-08-01 | End: 2018-08-17

## 2018-08-01 NOTE — ED PROVIDER NOTES
Patient Seen in: [facility]    History   Patient presents with:  Back Pain (musculoskeletal)    Stated Complaint: left upper back pain    HPI    The patient is a 16-year-old female with a history of anemia, torticollis ×1 All other systems reviewed and negative except as noted above.     Physical Exam   ED Triage Vitals [08/01/18 1541]  BP: 99/57  Pulse: 64  Resp: 20  Temp: 98.4 °F (36.9 °C)  Temp src: Oral  SpO2: 100 %  O2 Device: n/a    Current:BP 99/57   Pulse 64   T 8 (eight) hours as needed for Pain., Normal, Disp-20 tablet, R-0

## 2018-08-01 NOTE — TELEPHONE ENCOUNTER
Action Requested: Summary for Provider     []  Critical Lab, Recommendations Needed  [] Need Additional Advice  [x]   FYI    []   Need Orders  [] Need Medications Sent to Pharmacy  []  Other     SUMMARY: Patient advised go to ER/IC due to back and neck dao

## 2018-08-01 NOTE — ED INITIAL ASSESSMENT (HPI)
States she had sudden onset of left thoracic back pain after reaching with her right arm. Stiff, slow movements. Hard to take a deep breath. Tender to palpate. +CMS to extremities. Some nausea when pain is severe.

## 2018-08-17 ENCOUNTER — OFFICE VISIT (OUTPATIENT)
Dept: FAMILY MEDICINE CLINIC | Facility: CLINIC | Age: 38
End: 2018-08-17
Payer: COMMERCIAL

## 2018-08-17 VITALS
WEIGHT: 121 LBS | DIASTOLIC BLOOD PRESSURE: 58 MMHG | BODY MASS INDEX: 19.44 KG/M2 | SYSTOLIC BLOOD PRESSURE: 93 MMHG | HEIGHT: 66 IN | TEMPERATURE: 98 F | RESPIRATION RATE: 16 BRPM | HEART RATE: 55 BPM

## 2018-08-17 DIAGNOSIS — R04.0 EPISTAXIS: Primary | ICD-10-CM

## 2018-08-17 DIAGNOSIS — M43.6 TORTICOLLIS: ICD-10-CM

## 2018-08-17 PROCEDURE — 99212 OFFICE O/P EST SF 10 MIN: CPT | Performed by: FAMILY MEDICINE

## 2018-08-17 PROCEDURE — 99213 OFFICE O/P EST LOW 20 MIN: CPT | Performed by: FAMILY MEDICINE

## 2018-08-17 RX ORDER — MELOXICAM 7.5 MG/1
TABLET ORAL
Qty: 30 TABLET | Refills: 0 | Status: SHIPPED | OUTPATIENT
Start: 2018-08-17 | End: 2019-04-19

## 2018-08-17 NOTE — PROGRESS NOTES
Blood pressure 93/58, pulse 55, temperature 98.3 °F (36.8 °C), temperature source Oral, resp. rate 16, height 5' 6\" (1.676 m), weight 121 lb (54.9 kg), last menstrual period 07/28/2018. Following up for office visit for torticollis.   She reports that s

## 2019-03-05 ENCOUNTER — OFFICE VISIT (OUTPATIENT)
Dept: FAMILY MEDICINE CLINIC | Facility: CLINIC | Age: 39
End: 2019-03-05
Payer: COMMERCIAL

## 2019-03-05 ENCOUNTER — HOSPITAL ENCOUNTER (OUTPATIENT)
Dept: GENERAL RADIOLOGY | Age: 39
Discharge: HOME | End: 2019-03-05
Attending: FAMILY MEDICINE
Payer: COMMERCIAL

## 2019-03-05 VITALS
DIASTOLIC BLOOD PRESSURE: 61 MMHG | BODY MASS INDEX: 19.15 KG/M2 | HEIGHT: 66 IN | HEART RATE: 59 BPM | SYSTOLIC BLOOD PRESSURE: 95 MMHG | WEIGHT: 119.13 LBS

## 2019-03-05 DIAGNOSIS — R22.31 MASS OF HAND, RIGHT: Primary | ICD-10-CM

## 2019-03-05 DIAGNOSIS — R22.31 MASS OF HAND, RIGHT: ICD-10-CM

## 2019-03-05 PROCEDURE — 73130 X-RAY EXAM OF HAND: CPT | Performed by: FAMILY MEDICINE

## 2019-03-05 PROCEDURE — 99212 OFFICE O/P EST SF 10 MIN: CPT | Performed by: FAMILY MEDICINE

## 2019-03-05 NOTE — PROGRESS NOTES
Blood pressure 95/61, pulse 59, height 5' 6\" (1.676 m), weight 119 lb 2 oz (54 kg).     Patient presents today complaining of a painful lump on the palmar aspect of her right hand fourth metacarpal area    She reports it is tender to palpation    Objective

## 2019-03-21 ENCOUNTER — OFFICE VISIT (OUTPATIENT)
Dept: SURGERY | Facility: CLINIC | Age: 39
End: 2019-03-21
Payer: COMMERCIAL

## 2019-03-21 DIAGNOSIS — M67.441 GANGLION OF RIGHT HAND: Primary | ICD-10-CM

## 2019-03-21 PROCEDURE — 99243 OFF/OP CNSLTJ NEW/EST LOW 30: CPT | Performed by: PLASTIC SURGERY

## 2019-03-21 PROCEDURE — 99212 OFFICE O/P EST SF 10 MIN: CPT | Performed by: PLASTIC SURGERY

## 2019-03-21 RX ORDER — HYDROCODONE BITARTRATE AND ACETAMINOPHEN 5; 325 MG/1; MG/1
TABLET ORAL
Qty: 10 TABLET | Refills: 0 | Status: SHIPPED | OUTPATIENT
Start: 2019-03-21 | End: 2021-05-04 | Stop reason: ALTCHOICE

## 2019-03-21 NOTE — H&P
Kenyetta Ramírez is a 42year old female that presents with Patient presents with:  Ganglion: Volar RRF metacarpal  .    REFERRED BY:  Cristy Fajardo    Pacemaker: No  Latex Allergy: no  Coumadin: No  Plavix: No  Other anticoagulants: No  Cardiac stents: N Pregnancy 2012    per NextGen:  \"Pregnancy; Management:   (10-);  Provider:   Justin Larose; Comments:  APGAR:  9 (1 min), 9 (5 min) (10 min) - 2nd degree perineal laceration\"     Past Surgical History:   Procedure Laterality Date   •  Not Asked        Stress Concern: Not Asked        Weight Concern: Not Asked        Special Diet: Not Asked        Back Care: Not Asked        Exercise: Not Asked        Bike Helmet: Not Asked        Seat Belt: Not Asked        Self-Exams: Not Asked    Soci therapy, will have significant impact on the final result, and could lead to permanent pain, stiffness, weakness, and loss of function. Even with satisfactory healing, the hand/digit may not regain normal ROM or normal function.     She will be off work

## 2019-03-26 ENCOUNTER — TELEPHONE (OUTPATIENT)
Dept: SURGERY | Facility: CLINIC | Age: 39
End: 2019-03-26

## 2019-03-26 DIAGNOSIS — M67.441 GANGLION OF RIGHT HAND: Primary | ICD-10-CM

## 2019-04-16 ENCOUNTER — TELEPHONE (OUTPATIENT)
Dept: SURGERY | Facility: CLINIC | Age: 39
End: 2019-04-16

## 2019-04-16 NOTE — H&P
Salbador Lesches is a 42year old female that presents with Patient presents with:  Ganglion: Volar RRF metacarpal  .    REFERRED BY:  Emely Blanco    Pacemaker: No  Latex Allergy: no  Coumadin: No  Plavix: No  Other anticoagulants: No  Cardiac stents: N 6 lb(s) 12 oz Female\"   • Pregnancy     per NextGen:  \"Pregnancy; Management:  induced ; Outcome/detail:  Unknown sex\"   • Pregnancy     per NextGen:  \"Pregnancy; Management:   (10-);  Provider:   Navjot Mcneil; Comments:  JASBIR Not Asked        Caffeine Concern: No        Occupational Exposure: Not Asked        Hobby Hazards: Not Asked        Sleep Concern: Not Asked        Stress Concern: Not Asked        Weight Concern: Not Asked        Special Diet: Not Asked        Back Care: level is critical.  Therapy will be necessary post-operatively.   Failure to comply with post-operative instructions, including therapy, will have significant impact on the final result, and could lead to permanent pain, stiffness, weakness, and loss of fun

## 2019-04-16 NOTE — TELEPHONE ENCOUNTER
Spoke with patient. All changes to medications and allergies, per patient report, have been documented in the medical record.      Patient  has not been ill, hospitalized, or had any surgical procedures since last seen in our office on 3/21/19    Dr. Chandu Garcia

## 2019-04-19 RX ORDER — CETIRIZINE HYDROCHLORIDE 10 MG/1
10 TABLET ORAL DAILY PRN
COMMUNITY
End: 2021-05-04 | Stop reason: ALTCHOICE

## 2019-04-19 RX ORDER — FAMOTIDINE 20 MG/1
20 TABLET ORAL ONCE
Status: CANCELLED | OUTPATIENT
Start: 2019-04-19 | End: 2019-04-19

## 2019-04-19 RX ORDER — LORATADINE 10 MG/1
10 TABLET ORAL DAILY
COMMUNITY
End: 2021-05-04 | Stop reason: ALTCHOICE

## 2019-04-19 RX ORDER — METOCLOPRAMIDE 10 MG/1
10 TABLET ORAL ONCE
Status: CANCELLED | OUTPATIENT
Start: 2019-04-19 | End: 2019-04-19

## 2019-04-26 ENCOUNTER — HOSPITAL ENCOUNTER (OUTPATIENT)
Facility: HOSPITAL | Age: 39
Setting detail: HOSPITAL OUTPATIENT SURGERY
Discharge: HOME | End: 2019-04-26
Attending: PLASTIC SURGERY | Admitting: PLASTIC SURGERY
Payer: COMMERCIAL

## 2019-04-26 ENCOUNTER — ANESTHESIA (OUTPATIENT)
Dept: SURGERY | Facility: HOSPITAL | Age: 39
End: 2019-04-26
Payer: COMMERCIAL

## 2019-04-26 ENCOUNTER — ANESTHESIA EVENT (OUTPATIENT)
Dept: SURGERY | Facility: HOSPITAL | Age: 39
End: 2019-04-26
Payer: COMMERCIAL

## 2019-04-26 VITALS
HEIGHT: 65 IN | RESPIRATION RATE: 14 BRPM | SYSTOLIC BLOOD PRESSURE: 90 MMHG | TEMPERATURE: 99 F | DIASTOLIC BLOOD PRESSURE: 55 MMHG | WEIGHT: 117 LBS | OXYGEN SATURATION: 100 % | BODY MASS INDEX: 19.49 KG/M2 | HEART RATE: 53 BPM

## 2019-04-26 DIAGNOSIS — M67.441 GANGLION OF RIGHT HAND: Primary | ICD-10-CM

## 2019-04-26 PROCEDURE — 0LB70ZZ EXCISION OF RIGHT HAND TENDON, OPEN APPROACH: ICD-10-PCS | Performed by: PLASTIC SURGERY

## 2019-04-26 PROCEDURE — 26160 REMOVE TENDON SHEATH LESION: CPT | Performed by: PLASTIC SURGERY

## 2019-04-26 RX ORDER — HYDROCODONE BITARTRATE AND ACETAMINOPHEN 5; 325 MG/1; MG/1
1 TABLET ORAL AS NEEDED
Status: DISCONTINUED | OUTPATIENT
Start: 2019-04-26 | End: 2019-04-26

## 2019-04-26 RX ORDER — SODIUM CHLORIDE, SODIUM LACTATE, POTASSIUM CHLORIDE, CALCIUM CHLORIDE 600; 310; 30; 20 MG/100ML; MG/100ML; MG/100ML; MG/100ML
INJECTION, SOLUTION INTRAVENOUS CONTINUOUS
Status: DISCONTINUED | OUTPATIENT
Start: 2019-04-26 | End: 2019-04-26

## 2019-04-26 RX ORDER — MORPHINE SULFATE 10 MG/ML
6 INJECTION, SOLUTION INTRAMUSCULAR; INTRAVENOUS EVERY 10 MIN PRN
Status: DISCONTINUED | OUTPATIENT
Start: 2019-04-26 | End: 2019-04-26

## 2019-04-26 RX ORDER — NALOXONE HYDROCHLORIDE 0.4 MG/ML
80 INJECTION, SOLUTION INTRAMUSCULAR; INTRAVENOUS; SUBCUTANEOUS AS NEEDED
Status: DISCONTINUED | OUTPATIENT
Start: 2019-04-26 | End: 2019-04-26

## 2019-04-26 RX ORDER — HYDROCODONE BITARTRATE AND ACETAMINOPHEN 5; 325 MG/1; MG/1
2 TABLET ORAL AS NEEDED
Status: DISCONTINUED | OUTPATIENT
Start: 2019-04-26 | End: 2019-04-26

## 2019-04-26 RX ORDER — MORPHINE SULFATE 4 MG/ML
4 INJECTION, SOLUTION INTRAMUSCULAR; INTRAVENOUS EVERY 10 MIN PRN
Status: DISCONTINUED | OUTPATIENT
Start: 2019-04-26 | End: 2019-04-26

## 2019-04-26 RX ORDER — ACETAMINOPHEN 500 MG
1000 TABLET ORAL ONCE
Status: COMPLETED | OUTPATIENT
Start: 2019-04-26 | End: 2019-04-26

## 2019-04-26 RX ORDER — LIDOCAINE HYDROCHLORIDE 10 MG/ML
INJECTION, SOLUTION EPIDURAL; INFILTRATION; INTRACAUDAL; PERINEURAL AS NEEDED
Status: DISCONTINUED | OUTPATIENT
Start: 2019-04-26 | End: 2019-04-26 | Stop reason: HOSPADM

## 2019-04-26 RX ORDER — HYDROCODONE BITARTRATE AND ACETAMINOPHEN 5; 325 MG/1; MG/1
1 TABLET ORAL EVERY 4 HOURS PRN
Status: DISCONTINUED | OUTPATIENT
Start: 2019-04-26 | End: 2019-04-26

## 2019-04-26 RX ORDER — HALOPERIDOL 5 MG/ML
0.25 INJECTION INTRAMUSCULAR ONCE AS NEEDED
Status: DISCONTINUED | OUTPATIENT
Start: 2019-04-26 | End: 2019-04-26

## 2019-04-26 RX ORDER — MORPHINE SULFATE 2 MG/ML
2 INJECTION, SOLUTION INTRAMUSCULAR; INTRAVENOUS EVERY 10 MIN PRN
Status: DISCONTINUED | OUTPATIENT
Start: 2019-04-26 | End: 2019-04-26

## 2019-04-26 RX ORDER — MIDAZOLAM HYDROCHLORIDE 1 MG/ML
INJECTION INTRAMUSCULAR; INTRAVENOUS AS NEEDED
Status: DISCONTINUED | OUTPATIENT
Start: 2019-04-26 | End: 2019-04-26 | Stop reason: SURG

## 2019-04-26 RX ORDER — ONDANSETRON 2 MG/ML
4 INJECTION INTRAMUSCULAR; INTRAVENOUS ONCE AS NEEDED
Status: DISCONTINUED | OUTPATIENT
Start: 2019-04-26 | End: 2019-04-26

## 2019-04-26 RX ORDER — KETOROLAC TROMETHAMINE 30 MG/ML
INJECTION, SOLUTION INTRAMUSCULAR; INTRAVENOUS AS NEEDED
Status: DISCONTINUED | OUTPATIENT
Start: 2019-04-26 | End: 2019-04-26 | Stop reason: SURG

## 2019-04-26 RX ADMIN — KETOROLAC TROMETHAMINE 30 MG: 30 INJECTION, SOLUTION INTRAMUSCULAR; INTRAVENOUS at 09:45:00

## 2019-04-26 RX ADMIN — MIDAZOLAM HYDROCHLORIDE 2 MG: 1 INJECTION INTRAMUSCULAR; INTRAVENOUS at 08:55:00

## 2019-04-26 RX ADMIN — SODIUM CHLORIDE, SODIUM LACTATE, POTASSIUM CHLORIDE, CALCIUM CHLORIDE: 600; 310; 30; 20 INJECTION, SOLUTION INTRAVENOUS at 08:55:00

## 2019-04-26 RX ADMIN — SODIUM CHLORIDE, SODIUM LACTATE, POTASSIUM CHLORIDE, CALCIUM CHLORIDE: 600; 310; 30; 20 INJECTION, SOLUTION INTRAVENOUS at 09:47:00

## 2019-04-26 NOTE — H&P
Pacemaker: No  Latex Allergy: no  Coumadin: No  Plavix: No  Other anticoagulants: No  Cardiac stents: No     HAND DOMINANCE:      Right  Profession: , uses hands frequently     RECONSTRUCTIVE HISTORY     SUN EXPOSURE                Cur \"Pregnancy; Management:  induced ; Outcome/detail:  Unknown sex\"   • Pregnancy      per NextGen:  \"Pregnancy; Management:   (10-);  Provider:   Zac Hernández; Comments:  APGAR:  9 (1 min), 9 (5 min) (10 min) - 2nd degree perinea Occupational Exposure: Not Asked        Hobby Hazards: Not Asked        Sleep Concern: Not Asked        Stress Concern: Not Asked        Weight Concern: Not Asked        Special Diet: Not Asked        Back Care: Not Asked        Exercise: Not Asked will be necessary post-operatively.   Failure to comply with post-operative instructions, including therapy, will have significant impact on the final result, and could lead to permanent pain, stiffness, weakness, and loss of function.     Even with satisfa

## 2019-04-26 NOTE — ANESTHESIA POSTPROCEDURE EVALUATION
Patient: Lydia Lawtonu    Procedure Summary     Date:  04/26/19 Room / Location:  [address] [place] [address] MAIN OR 01 / [address] [facility] MAIN OR    Anesthesia Start:  3008 Anesthesia Stop:  4240    Procedure:  HAND GANGLION EXCISION (Right ) Diagnosis:  (ganglion)    Surgeon:  Elisa Flaherty

## 2019-04-26 NOTE — ANESTHESIA PREPROCEDURE EVALUATION
Anesthesia PreOp Note    HPI:     Aparna Ovalles is a 42year old female who presents for preoperative consultation requested by: Junior George MD    Date of Surgery: 4/26/2019    Procedure(s):  HAND GANGLION EXCISION  Indication: ganglion    Rele \"Pregnancy; Management:  ; Facility:  [facility]; Outcome/detail:  40 week 6 lb(s) 12 oz Female\"   • Pregnancy     per NextGen:  \"Pregnancy; Management:  induced ;  Outcome/detail:  Unknown sex\"   • Pregnancy 2012    per NextGen: Substance and Sexual Activity      Alcohol use:  Yes        Alcohol/week: 0.0 oz        Comment: occasionally      Drug use: No      Sexual activity: Not on file    Lifestyle      Physical activity:        Days per week: Not on file        Minutes per sess and Physical Exam     Patient summary reviewed and Nursing notes reviewed    Airway   Mallampati: I  TM distance: >3 FB  Neck ROM: full  Dental - normal exam     Pulmonary - negative ROS and normal exam   Cardiovascular - negative ROS and normal exam  Exer

## 2019-04-26 NOTE — INTERVAL H&P NOTE
Pre-op Diagnosis: ganglion    The above referenced H&P was reviewed by Marcia Moses MD on 4/26/2019, the patient was examined and no significant changes have occurred in the patient's condition since the H&P was performed.   I discussed with the p

## 2019-04-26 NOTE — BRIEF OP NOTE
Pre-Operative Diagnosis: ganglion     Post-Operative Diagnosis: ganglion      Procedure Performed:   Procedure(s):  excision ganglion right palm    Surgeon(s) and Role:     * Hayes Collins MD - Primary    Assistant(s):        Surgical Findings: Ga

## 2019-04-26 NOTE — OPERATIVE REPORT
[facility]    PATIENT'S NAME: Nito Leo   ATTENDING PHYSICIAN: Alf Crooks MD   OPERATING PHYSICIAN: Alf Crooks MD   PATIENT ACCOUNT#:   [id number]    LOCATION:  [place] [address]  MEDICAL RECORD #:   [id number]

## 2019-04-27 ENCOUNTER — TELEPHONE (OUTPATIENT)
Dept: SURGERY | Facility: CLINIC | Age: 39
End: 2019-04-27

## 2019-04-27 NOTE — TELEPHONE ENCOUNTER
Spoke with pt. Denies pain, numbness, edmea and need for analgesics. Intermittent tingling. Dressing CDI. Wearing sling, with hand above the level of the heart.   Reminded next appointment 4/29/19  Instructed to call the office with any questions and/or

## 2019-04-29 ENCOUNTER — NURSE ONLY (OUTPATIENT)
Dept: SURGERY | Facility: CLINIC | Age: 39
End: 2019-04-29
Payer: COMMERCIAL

## 2019-04-29 DIAGNOSIS — Z48.01 DRESSING CHANGE OR REMOVAL, SURGICAL WOUND: Primary | ICD-10-CM

## 2019-04-29 PROCEDURE — 99212 OFFICE O/P EST SF 10 MIN: CPT | Performed by: PLASTIC SURGERY

## 2019-04-29 NOTE — PROGRESS NOTES
Surgery 1: RRF A1 ganglion  - Date: 04/26/19  - Days Since: 3    [facility] Pt identified w/2 identifiers and orders verified. Pt presents w/ xeroform, gauze, and spandage.   Pt assisted to exam chair and exam chair reclined for

## 2019-04-30 ENCOUNTER — TELEPHONE (OUTPATIENT)
Dept: SURGERY | Facility: CLINIC | Age: 39
End: 2019-04-30

## 2019-04-30 NOTE — TELEPHONE ENCOUNTER
Forms from [facility] for short termed Disability were received via fax. Forms were scanned to KAREN Santos and [facility].  No HIPPA No fee taken

## 2019-05-01 NOTE — TELEPHONE ENCOUNTER
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 Logged for processing. TIFFANIE packet emailed to pt [email or web address]  NK

## 2019-05-06 ENCOUNTER — OFFICE VISIT (OUTPATIENT)
Dept: SURGERY | Facility: CLINIC | Age: 39
End: 2019-05-06
Payer: COMMERCIAL

## 2019-05-06 DIAGNOSIS — M25.641 JOINT STIFFNESS OF HAND, RIGHT: Primary | ICD-10-CM

## 2019-05-06 DIAGNOSIS — M62.81 DISTAL MUSCLE WEAKNESS: ICD-10-CM

## 2019-05-06 PROCEDURE — 97110 THERAPEUTIC EXERCISES: CPT | Performed by: OCCUPATIONAL THERAPIST

## 2019-05-06 PROCEDURE — 97166 OT EVAL MOD COMPLEX 45 MIN: CPT | Performed by: OCCUPATIONAL THERAPIST

## 2019-05-06 NOTE — PROGRESS NOTES
OCCUPATIONAL THERAPY EVALUATION:   Srinivasan Lepe   [id number]       SUBJECTIVE:    HX of Injury: RRF A1 ganglion  Chief Complaint:   Tenderness around the surgical site. .    Precautions: 2 # lifting restriction with the right hand.   Premorbid Functional S be reached in x 2 weeks:    1) Full functional use of the involved extremity for self-care, leisure and work related tasks:  . Patient will be seen 2 x /week for 1 weeks or a total of 2 visits.    Pt. was advised regarding the findings of this evalua

## 2019-05-06 NOTE — TELEPHONE ENCOUNTER
Pt came to appt with Dr Fransico Potter today, signed Sydnee Jose and pd $ 25 fee for [facility] papers. Scanned to TIFFANIE and brought originals to TIFFANIE @ [facility].

## 2019-05-08 NOTE — TELEPHONE ENCOUNTER
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 Logged for processing.  NK

## 2019-05-14 NOTE — TELEPHONE ENCOUNTER
Dr. Hanna Osuna,    2 Forms     Please sign off on form:  -Highlight the patient and hit \"Chart\" button. -In Chart Review, w/in the Encounter tab - click 1 time on the Telephone call encounter for 4/30/19.  Scroll down the telephone encounter.  -Click \"s

## 2019-05-16 NOTE — TELEPHONE ENCOUNTER
Faxed Disab form and RTW to [employer] - [phone]. Mailed copy to pt. Notified via [facility] [address].

## 2020-01-17 ENCOUNTER — TELEPHONE (OUTPATIENT)
Dept: FAMILY MEDICINE CLINIC | Facility: CLINIC | Age: 40
End: 2020-01-17

## 2020-01-17 ENCOUNTER — OFFICE VISIT (OUTPATIENT)
Dept: FAMILY MEDICINE CLINIC | Facility: CLINIC | Age: 40
End: 2020-01-17
Payer: COMMERCIAL

## 2020-01-17 ENCOUNTER — LAB ENCOUNTER (OUTPATIENT)
Dept: LAB | Age: 40
End: 2020-01-17
Attending: FAMILY MEDICINE
Payer: COMMERCIAL

## 2020-01-17 VITALS
SYSTOLIC BLOOD PRESSURE: 101 MMHG | WEIGHT: 119 LBS | HEART RATE: 70 BPM | BODY MASS INDEX: 19.13 KG/M2 | HEIGHT: 66 IN | DIASTOLIC BLOOD PRESSURE: 65 MMHG

## 2020-01-17 DIAGNOSIS — M79.606 PAIN OF LOWER EXTREMITY, UNSPECIFIED LATERALITY: ICD-10-CM

## 2020-01-17 DIAGNOSIS — Z86.2 HISTORY OF ANEMIA: ICD-10-CM

## 2020-01-17 DIAGNOSIS — R35.89 POLYURIA: ICD-10-CM

## 2020-01-17 LAB
ANION GAP SERPL CALC-SCNC: 4 MMOL/L (ref 0–18)
BACTERIA UR QL AUTO: NEGATIVE /HPF
BILIRUB UR QL: NEGATIVE
BUN BLD-MCNC: 6 MG/DL (ref 7–18)
BUN/CREAT SERPL: 10.5 (ref 10–20)
CALCIUM BLD-MCNC: 8.3 MG/DL (ref 8.5–10.1)
CHLORIDE SERPL-SCNC: 108 MMOL/L (ref 98–112)
CLARITY UR: CLEAR
CO2 SERPL-SCNC: 28 MMOL/L (ref 21–32)
COLOR UR: YELLOW
CREAT BLD-MCNC: 0.57 MG/DL (ref 0.55–1.02)
GLUCOSE BLD-MCNC: 86 MG/DL (ref 70–99)
GLUCOSE UR-MCNC: NEGATIVE MG/DL
HCG SERPL QL: NEGATIVE
HYALINE CASTS #/AREA URNS AUTO: 1 /LPF
KETONES UR-MCNC: NEGATIVE MG/DL
LEUKOCYTE ESTERASE UR QL STRIP.AUTO: NEGATIVE
NITRITE UR QL STRIP.AUTO: NEGATIVE
OSMOLALITY SERPL CALC.SUM OF ELEC: 287 MOSM/KG (ref 275–295)
PATIENT FASTING Y/N/NP: YES
PH UR: 7 [PH] (ref 5–8)
POTASSIUM SERPL-SCNC: 3.8 MMOL/L (ref 3.5–5.1)
PROT UR-MCNC: NEGATIVE MG/DL
RBC #/AREA URNS AUTO: 1 /HPF
SODIUM SERPL-SCNC: 140 MMOL/L (ref 136–145)
SP GR UR STRIP: 1.01 (ref 1–1.03)
T4 FREE SERPL-MCNC: 1 NG/DL (ref 0.8–1.7)
TSI SER-ACNC: 3.82 MIU/ML (ref 0.36–3.74)
UROBILINOGEN UR STRIP-ACNC: <2
WBC #/AREA URNS AUTO: 1 /HPF

## 2020-01-17 PROCEDURE — 80048 BASIC METABOLIC PNL TOTAL CA: CPT

## 2020-01-17 PROCEDURE — 99213 OFFICE O/P EST LOW 20 MIN: CPT | Performed by: FAMILY MEDICINE

## 2020-01-17 PROCEDURE — 84443 ASSAY THYROID STIM HORMONE: CPT

## 2020-01-17 PROCEDURE — 85025 COMPLETE CBC W/AUTO DIFF WBC: CPT

## 2020-01-17 PROCEDURE — 84439 ASSAY OF FREE THYROXINE: CPT

## 2020-01-17 PROCEDURE — 81001 URINALYSIS AUTO W/SCOPE: CPT

## 2020-01-17 PROCEDURE — 84703 CHORIONIC GONADOTROPIN ASSAY: CPT

## 2020-01-17 PROCEDURE — 85060 BLOOD SMEAR INTERPRETATION: CPT

## 2020-01-17 PROCEDURE — 36415 COLL VENOUS BLD VENIPUNCTURE: CPT

## 2020-01-17 PROCEDURE — 87086 URINE CULTURE/COLONY COUNT: CPT

## 2020-01-17 NOTE — PROGRESS NOTES
Blood pressure 101/65, pulse 70, height 5' 6\" (1.676 m), weight 119 lb (54 kg). Patient presents today reporting that she has bilateral leg pain that reflects when she felt she had low potassium levels previously. She denies any injuries.   She does ha

## 2020-01-18 ENCOUNTER — TELEPHONE (OUTPATIENT)
Dept: FAMILY MEDICINE CLINIC | Facility: CLINIC | Age: 40
End: 2020-01-18

## 2020-01-18 LAB
BASOPHILS # BLD AUTO: 0.03 X10(3) UL (ref 0–0.2)
BASOPHILS NFR BLD AUTO: 0.6 %
DEPRECATED RDW RBC AUTO: 45.2 FL (ref 35.1–46.3)
EOSINOPHIL # BLD AUTO: 0.12 X10(3) UL (ref 0–0.7)
EOSINOPHIL NFR BLD AUTO: 2.2 %
ERYTHROCYTE [DISTWIDTH] IN BLOOD BY AUTOMATED COUNT: 23.9 % (ref 11–15)
HCT VFR BLD AUTO: 32.8 % (ref 35–48)
HELMET CELLS BLD QL SMEAR: PRESENT
HGB BLD-MCNC: 9.5 G/DL (ref 12–16)
IMM GRANULOCYTES # BLD AUTO: 0.01 X10(3) UL (ref 0–1)
IMM GRANULOCYTES NFR BLD: 0.2 %
LYMPHOCYTES # BLD AUTO: 1.59 X10(3) UL (ref 1–4)
LYMPHOCYTES NFR BLD AUTO: 29.2 %
MCH RBC QN AUTO: 17 PG (ref 26–34)
MCHC RBC AUTO-ENTMCNC: 29 G/DL (ref 31–37)
MCV RBC AUTO: 58.7 FL (ref 80–100)
MONOCYTES # BLD AUTO: 0.29 X10(3) UL (ref 0.1–1)
MONOCYTES NFR BLD AUTO: 5.3 %
NEUTROPHILS # BLD AUTO: 3.4 X10 (3) UL (ref 1.5–7.7)
NEUTROPHILS # BLD AUTO: 3.4 X10(3) UL (ref 1.5–7.7)
NEUTROPHILS NFR BLD AUTO: 62.5 %
PLATELET # BLD AUTO: 211 10(3)UL (ref 150–450)
PLATELET MORPHOLOGY: NORMAL
RBC # BLD AUTO: 5.59 X10(6)UL (ref 3.8–5.3)
WBC # BLD AUTO: 5.4 X10(3) UL (ref 4–11)

## 2020-01-18 NOTE — TELEPHONE ENCOUNTER
Lab paged provider at 6:13 p.m. to inform provider that stat Bhcg was negative. Called informed patient. Patient expressed understanding. Advised to follow-up with  [facility] for further recommendations.

## 2020-01-21 ENCOUNTER — APPOINTMENT (OUTPATIENT)
Dept: LAB | Age: 40
End: 2020-01-21
Attending: FAMILY MEDICINE
Payer: COMMERCIAL

## 2020-01-21 DIAGNOSIS — E03.9 HYPOTHYROIDISM, UNSPECIFIED TYPE: ICD-10-CM

## 2020-01-21 PROCEDURE — 86800 THYROGLOBULIN ANTIBODY: CPT

## 2020-01-21 PROCEDURE — 36415 COLL VENOUS BLD VENIPUNCTURE: CPT

## 2020-01-22 LAB — THYROGLOB SERPL-MCNC: <15 U/ML (ref ?–60)

## 2020-02-10 ENCOUNTER — LAB ENCOUNTER (OUTPATIENT)
Dept: LAB | Facility: HOSPITAL | Age: 40
End: 2020-02-10
Attending: INTERNAL MEDICINE
Payer: COMMERCIAL

## 2020-02-10 ENCOUNTER — OFFICE VISIT (OUTPATIENT)
Dept: HEMATOLOGY/ONCOLOGY | Facility: HOSPITAL | Age: 40
End: 2020-02-10
Attending: INTERNAL MEDICINE
Payer: COMMERCIAL

## 2020-02-10 VITALS
WEIGHT: 122.56 LBS | OXYGEN SATURATION: 100 % | RESPIRATION RATE: 16 BRPM | HEART RATE: 60 BPM | DIASTOLIC BLOOD PRESSURE: 42 MMHG | TEMPERATURE: 98 F | SYSTOLIC BLOOD PRESSURE: 100 MMHG | BODY MASS INDEX: 20 KG/M2

## 2020-02-10 DIAGNOSIS — D50.9 MICROCYTIC ANEMIA: ICD-10-CM

## 2020-02-10 DIAGNOSIS — E53.8 B12 DEFICIENCY: Primary | ICD-10-CM

## 2020-02-10 DIAGNOSIS — E53.8 B12 DEFICIENCY: ICD-10-CM

## 2020-02-10 DIAGNOSIS — D56.9 THALASSEMIA, UNSPECIFIED TYPE: ICD-10-CM

## 2020-02-10 LAB — VIT B12 SERPL-MCNC: 275 PG/ML (ref 193–986)

## 2020-02-10 PROCEDURE — 36415 COLL VENOUS BLD VENIPUNCTURE: CPT

## 2020-02-10 PROCEDURE — 83921 ORGANIC ACID SINGLE QUANT: CPT

## 2020-02-10 PROCEDURE — 99214 OFFICE O/P EST MOD 30 MIN: CPT | Performed by: INTERNAL MEDICINE

## 2020-02-10 PROCEDURE — 82607 VITAMIN B-12: CPT

## 2020-02-10 NOTE — PROGRESS NOTES
Cancer Center Progress Note    Patient Name: Juliet Guardado   YOB: 1980   Medical Record Number: [id number]   Attending Physician: Renetta Alva M.D.      Chief Complaint:  Microcytic anemia, B12 deficiency    History of Present Illness right palm   • HAND GANGLION EXCISION Right 2019    Performed by Cody Simeon MD at [facility] OR   •   ,    • OTHER SURGICAL HISTORY      per NextGen:  \"(3) -induced\"       Family History:  Family History   Problem Relat Occupational Exposure: Not Asked        Hobby Hazards: Not Asked        Sleep Concern: Not Asked        Stress Concern: Not Asked        Weight Concern: Not Asked        Special Diet: Not Asked        Back Care: Not Asked        Exercise: Not Asked ANIONGAP 4 01/17/2020    GFRNAA 118 01/17/2020    GFRAA 136 01/17/2020    CA 8.3 (L) 01/17/2020    OSMOCALC 287 01/17/2020    ALKPHO 32 01/11/2017    AST 14 (L) 01/11/2017    ALT 15 01/11/2017    ALKPHOS 33 07/14/2016    BILT 0.9 01/11/2017    TP 7.3 01/11

## 2020-02-15 LAB — MMA: <0.1 UMOL/L

## 2020-10-06 ENCOUNTER — TELEMEDICINE (OUTPATIENT)
Dept: FAMILY MEDICINE CLINIC | Facility: CLINIC | Age: 40
End: 2020-10-06
Payer: COMMERCIAL

## 2020-10-06 ENCOUNTER — NURSE TRIAGE (OUTPATIENT)
Dept: FAMILY MEDICINE CLINIC | Facility: CLINIC | Age: 40
End: 2020-10-06

## 2020-10-06 DIAGNOSIS — J02.9 PHARYNGITIS, UNSPECIFIED ETIOLOGY: Primary | ICD-10-CM

## 2020-10-06 PROCEDURE — 99213 OFFICE O/P EST LOW 20 MIN: CPT | Performed by: FAMILY MEDICINE

## 2020-10-06 RX ORDER — PENICILLIN V POTASSIUM 500 MG/1
500 TABLET ORAL 3 TIMES DAILY
Qty: 30 TABLET | Refills: 0 | Status: SHIPPED | OUTPATIENT
Start: 2020-10-06 | End: 2021-05-04 | Stop reason: ALTCHOICE

## 2020-10-06 NOTE — PROGRESS NOTES
VIDEO VISIT    1 week history of sore throat. Has dysphagia no dysphonia. .  No nasal congestion or known cold exposure. She denies any coughing. No ear pain or sneezing. Some watery itchy eyes NO FEVER     No vomiting no diarrhea.     Objective patient

## 2020-10-06 NOTE — TELEPHONE ENCOUNTER
Action Requested: Summary for Provider     []  Critical Lab, Recommendations Needed  [] Need Additional Advice  []   FYI    []   Need Orders  [] Need Medications Sent to Pharmacy  []  Other     SUMMARY: Patient reports sore throat for 1 week, has pain with

## 2020-10-07 ENCOUNTER — APPOINTMENT (OUTPATIENT)
Dept: LAB | Age: 40
End: 2020-10-07
Attending: FAMILY MEDICINE
Payer: COMMERCIAL

## 2020-10-07 DIAGNOSIS — J02.9 PHARYNGITIS, UNSPECIFIED ETIOLOGY: ICD-10-CM

## 2021-02-16 ENCOUNTER — OFFICE VISIT (OUTPATIENT)
Dept: HEMATOLOGY/ONCOLOGY | Facility: HOSPITAL | Age: 41
End: 2021-02-16
Attending: INTERNAL MEDICINE
Payer: COMMERCIAL

## 2021-02-16 VITALS
TEMPERATURE: 99 F | BODY MASS INDEX: 19.61 KG/M2 | DIASTOLIC BLOOD PRESSURE: 63 MMHG | HEART RATE: 84 BPM | OXYGEN SATURATION: 98 % | RESPIRATION RATE: 16 BRPM | SYSTOLIC BLOOD PRESSURE: 93 MMHG | WEIGHT: 122 LBS | HEIGHT: 66 IN

## 2021-02-16 DIAGNOSIS — D50.9 MICROCYTIC ANEMIA: ICD-10-CM

## 2021-02-16 DIAGNOSIS — D56.9 THALASSEMIA, UNSPECIFIED TYPE: ICD-10-CM

## 2021-02-16 DIAGNOSIS — E53.8 B12 DEFICIENCY: Primary | ICD-10-CM

## 2021-02-16 LAB
BASOPHILS # BLD AUTO: 0.02 X10(3) UL (ref 0–0.2)
BASOPHILS NFR BLD AUTO: 0.4 %
DEPRECATED HBV CORE AB SER IA-ACNC: 93.9 NG/ML
DEPRECATED RDW RBC AUTO: 46.9 FL (ref 35.1–46.3)
EOSINOPHIL # BLD AUTO: 0.11 X10(3) UL (ref 0–0.7)
EOSINOPHIL NFR BLD AUTO: 2.4 %
ERYTHROCYTE [DISTWIDTH] IN BLOOD BY AUTOMATED COUNT: 25 % (ref 11–15)
HCT VFR BLD AUTO: 31.8 %
HGB BLD-MCNC: 9.4 G/DL
IMM GRANULOCYTES # BLD AUTO: 0.01 X10(3) UL (ref 0–1)
IMM GRANULOCYTES NFR BLD: 0.2 %
IRON SATURATION: 43 %
IRON SERPL-MCNC: 113 UG/DL
LYMPHOCYTES # BLD AUTO: 1.36 X10(3) UL (ref 1–4)
LYMPHOCYTES NFR BLD AUTO: 29.4 %
MCH RBC QN AUTO: 17.2 PG (ref 26–34)
MCHC RBC AUTO-ENTMCNC: 29.6 G/DL (ref 31–37)
MCV RBC AUTO: 58 FL
MONOCYTES # BLD AUTO: 0.25 X10(3) UL (ref 0.1–1)
MONOCYTES NFR BLD AUTO: 5.4 %
NEUTROPHILS # BLD AUTO: 2.88 X10 (3) UL (ref 1.5–7.7)
NEUTROPHILS # BLD AUTO: 2.88 X10(3) UL (ref 1.5–7.7)
NEUTROPHILS NFR BLD AUTO: 62.2 %
PLATELET # BLD AUTO: 198 10(3)UL (ref 150–450)
PLATELET MORPHOLOGY: NORMAL
RBC # BLD AUTO: 5.48 X10(6)UL
TOTAL IRON BINDING CAPACITY: 265 UG/DL (ref 240–450)
TRANSFERRIN SERPL-MCNC: 178 MG/DL (ref 200–360)
VIT B12 SERPL-MCNC: 242 PG/ML (ref 193–986)
WBC # BLD AUTO: 4.6 X10(3) UL (ref 4–11)

## 2021-02-16 PROCEDURE — 36415 COLL VENOUS BLD VENIPUNCTURE: CPT

## 2021-02-16 PROCEDURE — 99214 OFFICE O/P EST MOD 30 MIN: CPT | Performed by: INTERNAL MEDICINE

## 2021-02-16 NOTE — PROGRESS NOTES
Cancer Center Progress Note    Patient Name: Cora Guardado   YOB: 1980   Medical Record Number: [id number]   Attending Physician: Chinyere Winston M.D.      Chief Complaint:  Microcytic anemia, B12 deficiency    History of Present Illness right palm   • HAND GANGLION EXCISION Right 2019    Performed by Michaela Chavira MD at [facility] OR   •   ,    • OTHER SURGICAL HISTORY      per NextGen:  \"(3) -induced\"       Family History:  Family History   Problem Relat Exposure: Not Asked        Hobby Hazards: Not Asked        Sleep Concern: Not Asked        Stress Concern: Not Asked        Weight Concern: Not Asked        Special Diet: Not Asked        Back Care: Not Asked        Exercise: Not Asked        Bike Helmet: Laboratory:  No results for input(s): WBC, HGB, PLT, NE, NEUT in the last 504 hours.       Lab Results   Component Value Date    GLU 86 01/17/2020    BUN 6 (L) 01/17/2020    BUNCREA 10.5 01/17/2020    CREATSERUM 0.57 01/17/2020    ANIONGAP 4 01/17/202

## 2021-02-17 ENCOUNTER — TELEPHONE (OUTPATIENT)
Dept: HEMATOLOGY/ONCOLOGY | Facility: HOSPITAL | Age: 41
End: 2021-02-17

## 2021-02-17 NOTE — TELEPHONE ENCOUNTER
Called patient w results of labs which were drawn after MD visit yesterday. Dr Sky Gallo recommends she have repeat labs and MDV in 5 months, and that she stop taking iron (she is currently taking 1-2 iron tabs per week.) Follow up for September scheduled.  She

## 2021-02-19 LAB — MMA: 0.1 UMOL/L

## 2021-03-18 ENCOUNTER — TELEPHONE (OUTPATIENT)
Dept: FAMILY MEDICINE CLINIC | Facility: CLINIC | Age: 41
End: 2021-03-18

## 2021-03-18 NOTE — TELEPHONE ENCOUNTER
Patient states she may be able to receive the Covid vaccination through her work as early as this Saturday, and asking if ok to take.

## 2021-05-03 ENCOUNTER — TELEPHONE (OUTPATIENT)
Dept: HEMATOLOGY/ONCOLOGY | Facility: HOSPITAL | Age: 41
End: 2021-05-03

## 2021-05-03 ENCOUNTER — HOSPITAL ENCOUNTER (OUTPATIENT)
Age: 41
Discharge: ANOTHER HOSPITAL | End: 2021-05-03
Payer: COMMERCIAL

## 2021-05-03 ENCOUNTER — HOSPITAL ENCOUNTER (EMERGENCY)
Facility: HOSPITAL | Age: 41
Discharge: LEFT BEFORE DISCHARGE | End: 2021-05-03
Payer: COMMERCIAL

## 2021-05-03 VITALS
SYSTOLIC BLOOD PRESSURE: 126 MMHG | HEART RATE: 62 BPM | OXYGEN SATURATION: 100 % | DIASTOLIC BLOOD PRESSURE: 69 MMHG | RESPIRATION RATE: 18 BRPM | TEMPERATURE: 98 F

## 2021-05-03 VITALS
TEMPERATURE: 98 F | SYSTOLIC BLOOD PRESSURE: 106 MMHG | DIASTOLIC BLOOD PRESSURE: 69 MMHG | HEART RATE: 67 BPM | RESPIRATION RATE: 20 BRPM | WEIGHT: 122 LBS | BODY MASS INDEX: 19.61 KG/M2 | OXYGEN SATURATION: 99 % | HEIGHT: 66 IN

## 2021-05-03 DIAGNOSIS — H53.9 VISION CHANGES: Primary | ICD-10-CM

## 2021-05-03 DIAGNOSIS — R42 DIZZINESS: ICD-10-CM

## 2021-05-03 PROCEDURE — 93000 ELECTROCARDIOGRAM COMPLETE: CPT | Performed by: NURSE PRACTITIONER

## 2021-05-03 PROCEDURE — 99213 OFFICE O/P EST LOW 20 MIN: CPT | Performed by: NURSE PRACTITIONER

## 2021-05-03 NOTE — ED INITIAL ASSESSMENT (HPI)
C/o dizziness. States this is a chronic problem, but had a syncopal episode last Monday. It was not witnessed. Hx of anemia. States \"I feel like I'm drunk. \" denies chest pain or shortness of breath.

## 2021-05-03 NOTE — TELEPHONE ENCOUNTER
Patient of Dr King Daughters - B 12 Deficiency/Microcytic Anemia    Dizziness: (Patient reports that last Monday she was feeling dizzy. She was holding on to the wall and tried to sit on the floor. She passed out and lost consciousness. She woke up on the floor.  Sin

## 2021-05-03 NOTE — TELEPHONE ENCOUNTER
Patient says two weeks ago she started having symptoms. She says she is currently experiencing symptoms. She did not want to give me any further information.

## 2021-05-03 NOTE — ED PROVIDER NOTES
Patient Seen in: [facility]      History   Patient presents with:  Dizziness    Stated Complaint: dizzy/passed out last week    HPI/Subjective:   Patient presents to the immediate care accompanied by self.   Patient reports last Monday while s Tobacco Use      Smoking status: Never Smoker      Smokeless tobacco: Never Used    Vaping Use      Vaping Use: Never used    Alcohol use: Yes      Alcohol/week: 0.0 standard drinks      Comment: occasionally    Drug use:  No             Review of Systems rhinorrhea. Mouth/Throat:      Mouth: Mucous membranes are moist.      Pharynx: No oropharyngeal exudate or posterior oropharyngeal erythema. Eyes:      General: No visual field deficit or scleral icterus. Right eye: No discharge.          Lef EKG Report.   Rate: 58  Rhythm: Sinus bradycardia  Reading: Discussed results with Dr. Rosalinda Guerrero, patient has inverted T waves in V2 and V3              Patient will be sent to the emergency room for further imaging         MDM      Differential diagnosis: diagnosis)  Dizziness     Disposition: Ic to ed  5/3/2021  7:03 pm    Follow-up:  No follow-up provider specified.         Medications Prescribed:  Discharge Medication List as of 5/3/2021  7:08 PM

## 2021-05-04 ENCOUNTER — OFFICE VISIT (OUTPATIENT)
Dept: FAMILY MEDICINE CLINIC | Facility: CLINIC | Age: 41
End: 2021-05-04
Payer: COMMERCIAL

## 2021-05-04 ENCOUNTER — NURSE TRIAGE (OUTPATIENT)
Dept: FAMILY MEDICINE CLINIC | Facility: CLINIC | Age: 41
End: 2021-05-04

## 2021-05-04 ENCOUNTER — LAB ENCOUNTER (OUTPATIENT)
Dept: LAB | Age: 41
End: 2021-05-04
Attending: FAMILY MEDICINE
Payer: COMMERCIAL

## 2021-05-04 VITALS
BODY MASS INDEX: 20.25 KG/M2 | WEIGHT: 126 LBS | HEART RATE: 63 BPM | SYSTOLIC BLOOD PRESSURE: 97 MMHG | DIASTOLIC BLOOD PRESSURE: 61 MMHG | HEIGHT: 66 IN

## 2021-05-04 DIAGNOSIS — R42 DIZZINESS: Primary | ICD-10-CM

## 2021-05-04 DIAGNOSIS — E61.1 IRON DEFICIENCY: ICD-10-CM

## 2021-05-04 DIAGNOSIS — E53.8 VITAMIN B12 DEFICIENCY: ICD-10-CM

## 2021-05-04 DIAGNOSIS — R42 DIZZINESS: ICD-10-CM

## 2021-05-04 PROCEDURE — 82607 VITAMIN B-12: CPT

## 2021-05-04 PROCEDURE — 85025 COMPLETE CBC W/AUTO DIFF WBC: CPT

## 2021-05-04 PROCEDURE — 80053 COMPREHEN METABOLIC PANEL: CPT

## 2021-05-04 PROCEDURE — 36415 COLL VENOUS BLD VENIPUNCTURE: CPT

## 2021-05-04 PROCEDURE — 3074F SYST BP LT 130 MM HG: CPT | Performed by: PHYSICIAN ASSISTANT

## 2021-05-04 PROCEDURE — 3008F BODY MASS INDEX DOCD: CPT | Performed by: PHYSICIAN ASSISTANT

## 2021-05-04 PROCEDURE — 84443 ASSAY THYROID STIM HORMONE: CPT

## 2021-05-04 PROCEDURE — 99213 OFFICE O/P EST LOW 20 MIN: CPT | Performed by: PHYSICIAN ASSISTANT

## 2021-05-04 PROCEDURE — 82306 VITAMIN D 25 HYDROXY: CPT

## 2021-05-04 PROCEDURE — 3078F DIAST BP <80 MM HG: CPT | Performed by: PHYSICIAN ASSISTANT

## 2021-05-04 NOTE — PROGRESS NOTES
HPI:     Dizziness  This is a new problem. Episode onset: 1 week. The problem has been unchanged.  Pertinent negatives include no abdominal pain, chest pain, chills, congestion, coughing, fatigue, fever, headaches, nausea, neck pain, numbness, rash, sore th placement) Sister    • Hypertension Father    • Other (Other) Father         stroke       Social History:   Social History    Socioeconomic History      Marital status:       Spouse name: Not on file      Number of children: Not on file      Years o Partner Violence:       Fear of Current or Ex-Partner:       Emotionally Abused:       Physically Abused:       Sexually Abused:     Review of Systems:   Review of Systems   Constitutional: Negative for activity change, chills, fatigue and fever.    HENT: ADE Chest:      Chest wall: No tenderness. Abdominal:      General: Abdomen is flat. Bowel sounds are normal. There is no distension. Palpations: Abdomen is soft. Tenderness: There is no abdominal tenderness.  There is no right CVA tenderness or l

## 2021-05-04 NOTE — TELEPHONE ENCOUNTER
Action Requested: Summary for Provider     []  Critical Lab, Recommendations Needed  [] Need Additional Advice  []   FYI    []   Need Orders  [] Need Medications Sent to Pharmacy  []  Other     SUMMARY:     Has been feeling dizzy for 1 week.   Patient unsur

## 2021-05-04 NOTE — ED INITIAL ASSESSMENT (HPI)
Pt to ED from [address] [place] [address] with c/o dizziness since last Monday. Pt denies fall or head trauma. Pt states hx of anemia with blood transfusion. Pt denies bleeding. Pt denies chest pain or sob. No respiratory distress noted. 99% on room air.  Pt is alert and oriented x

## 2021-05-05 DIAGNOSIS — E61.1 IRON DEFICIENCY: Primary | ICD-10-CM

## 2021-05-05 RX ORDER — ERGOCALCIFEROL 1.25 MG/1
50000 CAPSULE ORAL
Qty: 12 CAPSULE | Refills: 3 | Status: SHIPPED | OUTPATIENT
Start: 2021-05-05 | End: 2021-08-03

## 2021-05-07 ENCOUNTER — NURSE ONLY (OUTPATIENT)
Dept: FAMILY MEDICINE CLINIC | Facility: CLINIC | Age: 41
End: 2021-05-07
Payer: COMMERCIAL

## 2021-05-07 DIAGNOSIS — E53.8 VITAMIN B12 DEFICIENCY: Primary | ICD-10-CM

## 2021-05-07 PROCEDURE — 96372 THER/PROPH/DIAG INJ SC/IM: CPT | Performed by: FAMILY MEDICINE

## 2021-05-07 RX ORDER — CYANOCOBALAMIN 1000 UG/ML
1000 INJECTION INTRAMUSCULAR; SUBCUTANEOUS ONCE
Status: COMPLETED | OUTPATIENT
Start: 2021-05-07 | End: 2021-05-07

## 2021-05-07 RX ADMIN — CYANOCOBALAMIN 1000 MCG: 1000 INJECTION INTRAMUSCULAR; SUBCUTANEOUS at 14:07:00

## 2021-05-07 NOTE — PROGRESS NOTES
Patient is here for vitamin b12 injection. Patients full name, , and injection verified. Patient tolerated injection well.

## 2021-05-27 ENCOUNTER — HOSPITAL ENCOUNTER (OUTPATIENT)
Dept: CV DIAGNOSTICS | Facility: HOSPITAL | Age: 41
Discharge: HOME | End: 2021-05-27
Attending: PHYSICIAN ASSISTANT
Payer: COMMERCIAL

## 2021-05-27 DIAGNOSIS — R42 DIZZINESS: ICD-10-CM

## 2021-05-27 PROCEDURE — 93306 TTE W/DOPPLER COMPLETE: CPT | Performed by: PHYSICIAN ASSISTANT

## 2021-09-02 ENCOUNTER — OFFICE VISIT (OUTPATIENT)
Dept: HEMATOLOGY/ONCOLOGY | Facility: HOSPITAL | Age: 41
End: 2021-09-02
Attending: INTERNAL MEDICINE
Payer: COMMERCIAL

## 2021-09-02 VITALS
HEIGHT: 66 IN | BODY MASS INDEX: 19.44 KG/M2 | SYSTOLIC BLOOD PRESSURE: 87 MMHG | HEART RATE: 54 BPM | WEIGHT: 121 LBS | RESPIRATION RATE: 16 BRPM | DIASTOLIC BLOOD PRESSURE: 46 MMHG

## 2021-09-02 DIAGNOSIS — D50.9 MICROCYTIC ANEMIA: ICD-10-CM

## 2021-09-02 DIAGNOSIS — E53.8 B12 DEFICIENCY: ICD-10-CM

## 2021-09-02 DIAGNOSIS — D56.9 THALASSEMIA, UNSPECIFIED TYPE: Primary | ICD-10-CM

## 2021-09-02 DIAGNOSIS — D56.9 THALASSEMIA, UNSPECIFIED TYPE: ICD-10-CM

## 2021-09-02 LAB
BASOPHILS # BLD AUTO: 0.02 X10(3) UL (ref 0–0.2)
BASOPHILS NFR BLD AUTO: 0.4 %
DEPRECATED HBV CORE AB SER IA-ACNC: 85.6 NG/ML
DEPRECATED RDW RBC AUTO: 43.7 FL (ref 35.1–46.3)
EOSINOPHIL # BLD AUTO: 0.13 X10(3) UL (ref 0–0.7)
EOSINOPHIL NFR BLD AUTO: 2.8 %
ERYTHROCYTE [DISTWIDTH] IN BLOOD BY AUTOMATED COUNT: 23.2 % (ref 11–15)
HCT VFR BLD AUTO: 29 %
HGB BLD-MCNC: 8.8 G/DL
IMM GRANULOCYTES # BLD AUTO: 0.01 X10(3) UL (ref 0–1)
IMM GRANULOCYTES NFR BLD: 0.2 %
IRON SATURATION: 41 %
IRON SERPL-MCNC: 109 UG/DL
LYMPHOCYTES # BLD AUTO: 1.62 X10(3) UL (ref 1–4)
LYMPHOCYTES NFR BLD AUTO: 34.3 %
MCH RBC QN AUTO: 17.4 PG (ref 26–34)
MCHC RBC AUTO-ENTMCNC: 30.3 G/DL (ref 31–37)
MCV RBC AUTO: 57.4 FL
MONOCYTES # BLD AUTO: 0.3 X10(3) UL (ref 0.1–1)
MONOCYTES NFR BLD AUTO: 6.4 %
NEUTROPHILS # BLD AUTO: 2.64 X10 (3) UL (ref 1.5–7.7)
NEUTROPHILS # BLD AUTO: 2.64 X10(3) UL (ref 1.5–7.7)
NEUTROPHILS NFR BLD AUTO: 55.9 %
PLATELET # BLD AUTO: 183 10(3)UL (ref 150–450)
PLATELET MORPHOLOGY: NORMAL
RBC # BLD AUTO: 5.05 X10(6)UL
TOTAL IRON BINDING CAPACITY: 264 UG/DL (ref 240–450)
TRANSFERRIN SERPL-MCNC: 177 MG/DL (ref 200–360)
VIT B12 SERPL-MCNC: 241 PG/ML (ref 193–986)
WBC # BLD AUTO: 4.7 X10(3) UL (ref 4–11)

## 2021-09-02 PROCEDURE — 85025 COMPLETE CBC W/AUTO DIFF WBC: CPT

## 2021-09-02 PROCEDURE — 82607 VITAMIN B-12: CPT

## 2021-09-02 PROCEDURE — 36415 COLL VENOUS BLD VENIPUNCTURE: CPT

## 2021-09-02 PROCEDURE — 99214 OFFICE O/P EST MOD 30 MIN: CPT | Performed by: INTERNAL MEDICINE

## 2021-09-02 PROCEDURE — 84466 ASSAY OF TRANSFERRIN: CPT

## 2021-09-02 PROCEDURE — 81269 HBA1/HBA2 GENE DUP/DEL VRNTS: CPT

## 2021-09-02 PROCEDURE — 83540 ASSAY OF IRON: CPT

## 2021-09-02 PROCEDURE — 83921 ORGANIC ACID SINGLE QUANT: CPT

## 2021-09-02 PROCEDURE — 82728 ASSAY OF FERRITIN: CPT

## 2021-09-02 NOTE — PROGRESS NOTES
Cancer Center Progress Note    Patient Name: Debbi Guardado   YOB: 1980   Medical Record Number: [id number]   Attending Physician: Tanvi Barton M.D.      Chief Complaint:  Microcytic anemia, B12 deficiency    History of Present Illness right palm   •   ,    • OTHER SURGICAL HISTORY      per NextGen:  \"(3) -induced\"       Family History:  Family History   Problem Relation Age of Onset   • Other (Mitral valve placement) Sister    • Hypertension Father    • Other (Othe Gatherings with Friends and Family:       Attends Oriental orthodox Services:       Active Member of Clubs or Organizations:       Attends Club or Organization Meetings:       Marital Status:   Intimate Partner Violence:       Fear of Current or Ex-Partner:       Plan:  68-year-old female with a long-standing history of microcytic anemia. She also has a history of B12 deficiency dating back to 2011 with a B12 level of less than 200. In the setting of her microcytosis she has had normal iron studies.   Previous hem

## 2021-09-05 LAB — MMA: 0.1 UMOL/L

## 2021-11-16 ENCOUNTER — LAB ENCOUNTER (OUTPATIENT)
Dept: LAB | Age: 41
End: 2021-11-16
Attending: ANESTHESIOLOGY
Payer: COMMERCIAL

## 2021-11-16 DIAGNOSIS — D56.9 THALASSEMIA, UNSPECIFIED TYPE: ICD-10-CM

## 2021-11-16 PROCEDURE — 36415 COLL VENOUS BLD VENIPUNCTURE: CPT

## 2021-11-16 PROCEDURE — 85025 COMPLETE CBC W/AUTO DIFF WBC: CPT

## 2021-11-16 PROCEDURE — 85060 BLOOD SMEAR INTERPRETATION: CPT

## 2021-11-17 ENCOUNTER — OFFICE VISIT (OUTPATIENT)
Dept: HEMATOLOGY/ONCOLOGY | Facility: HOSPITAL | Age: 41
End: 2021-11-17
Attending: INTERNAL MEDICINE
Payer: COMMERCIAL

## 2021-11-17 VITALS
RESPIRATION RATE: 16 BRPM | HEART RATE: 56 BPM | WEIGHT: 123.38 LBS | HEIGHT: 66 IN | OXYGEN SATURATION: 99 % | TEMPERATURE: 99 F | BODY MASS INDEX: 19.83 KG/M2 | DIASTOLIC BLOOD PRESSURE: 54 MMHG | SYSTOLIC BLOOD PRESSURE: 91 MMHG

## 2021-11-17 DIAGNOSIS — D56.0 HEMOGLOBIN H DISEASE (HCC): ICD-10-CM

## 2021-11-17 DIAGNOSIS — E53.8 B12 DEFICIENCY: Primary | ICD-10-CM

## 2021-11-17 DIAGNOSIS — D50.9 MICROCYTIC ANEMIA: ICD-10-CM

## 2021-11-17 DIAGNOSIS — D56.9 THALASSEMIA, UNSPECIFIED TYPE: ICD-10-CM

## 2021-11-17 PROCEDURE — 99214 OFFICE O/P EST MOD 30 MIN: CPT | Performed by: INTERNAL MEDICINE

## 2021-11-17 NOTE — PROGRESS NOTES
Cancer Center Progress Note    Patient Name: Juliet Guardado   YOB: 1980   Medical Record Number: [id number]   Attending Physician: Renetta Alva M.D.      Chief Complaint:  Microcytic anemia, B12 deficiency    History of Present Illness KERI,HAND/HUNTERR Right 2019    Excision of ganglion cyst of right palm   •   ,    • OTHER SURGICAL HISTORY      per NextGen:  \"(3) -induced\"       Family History:  Family History   Problem Relation Age of Onset   • Other (Mitral Location: Left arm, Patient Position: Sitting, Cuff Size: adult)   Pulse 56   Temp 98.6 °F (37 °C) (Oral)   Resp 16   Ht 1.676 m (5' 6\")   Wt 56 kg (123 lb 6.4 oz)   SpO2 99%   BMI 19.92 kg/m²     Physical Examination:  General: Patient is alert and orien iron studies B12    The patient's emotional well being was assessed and resources were discussed. Appropriate resources were reviewed and discussed with the pateint and family.      Data: Moderate CBC alpha gene sequencing blood smear review    Karmen Mancia

## 2021-12-07 ENCOUNTER — OFFICE VISIT (OUTPATIENT)
Dept: FAMILY MEDICINE CLINIC | Facility: CLINIC | Age: 41
End: 2021-12-07
Payer: COMMERCIAL

## 2021-12-07 ENCOUNTER — NURSE TRIAGE (OUTPATIENT)
Dept: FAMILY MEDICINE CLINIC | Facility: CLINIC | Age: 41
End: 2021-12-07

## 2021-12-07 VITALS
SYSTOLIC BLOOD PRESSURE: 98 MMHG | DIASTOLIC BLOOD PRESSURE: 63 MMHG | HEIGHT: 66 IN | HEART RATE: 71 BPM | BODY MASS INDEX: 20.09 KG/M2 | WEIGHT: 125 LBS

## 2021-12-07 DIAGNOSIS — B86 SCABIES: Primary | ICD-10-CM

## 2021-12-07 PROCEDURE — 3008F BODY MASS INDEX DOCD: CPT | Performed by: PHYSICIAN ASSISTANT

## 2021-12-07 PROCEDURE — 3078F DIAST BP <80 MM HG: CPT | Performed by: PHYSICIAN ASSISTANT

## 2021-12-07 PROCEDURE — 99213 OFFICE O/P EST LOW 20 MIN: CPT | Performed by: PHYSICIAN ASSISTANT

## 2021-12-07 PROCEDURE — 3074F SYST BP LT 130 MM HG: CPT | Performed by: PHYSICIAN ASSISTANT

## 2021-12-07 RX ORDER — MELATONIN
1000 DAILY
COMMUNITY

## 2021-12-07 RX ORDER — PERMETHRIN 50 MG/G
CREAM TOPICAL
Qty: 60 G | Refills: 0 | Status: SHIPPED | OUTPATIENT
Start: 2021-12-07

## 2021-12-07 NOTE — PATIENT INSTRUCTIONS
Scabies   Scabies is a skin infection. It's caused by a tiny parasitic mite that's too small to see directly. It can be seen under a microscope, but it's usually recognized only by the rash and symptoms it causes.  This can make it hard to diagnose since medicines. · Use the cream on your body when your skin is cool and dry. Don’t use it after a hot shower or bath. · Follow your healthcare provider's instructions for applying the medicine. Usually the cream is put on your whole body.  This means from your cleared. Follow-up care  Follow up with your healthcare provider, or as advised. Call your provider if your symptoms don’t improve after 1 week, or if new burrows or rashes appear.   When to seek medical advice  Call your healthcare provider right away i

## 2021-12-07 NOTE — TELEPHONE ENCOUNTER
Action Requested: Summary for Provider     []  Critical Lab, Recommendations Needed  [] Need Additional Advice  []   FYI    []   Need Orders  [] Need Medications Sent to Pharmacy  []  Other     SUMMARY: Pt with 2 weeks blister-like generalized body rash th

## 2021-12-07 NOTE — PROGRESS NOTES
HPI:     HPI  46year-old female is here in the office complaining of rashes for the past 2 weeks. The rash is very itchy and blister. The rashes are located on arms, legs, abdomen, back. The itchy is worse at night.      Medications:     Current Outpatient enedina,hand/lonnyr Right 2019    Excision of ganglion cyst of right palm   •   ,    • Other surgical history      per NextGen:  \"(3) -induced\"       Family History:     Family History   Problem Relation Age of Onset   • Other (Mitr sinus pressure, sinus pain and sore throat. Respiratory: Negative for cough, chest tightness, shortness of breath and wheezing. Cardiovascular: Negative for chest pain and palpitations.    Gastrointestinal: Negative for abdominal distention, abdominal questions or concerns.

## 2021-12-13 ENCOUNTER — TELEPHONE (OUTPATIENT)
Dept: FAMILY MEDICINE CLINIC | Facility: CLINIC | Age: 41
End: 2021-12-13

## 2021-12-13 RX ORDER — HYDROXYZINE HYDROCHLORIDE 25 MG/1
25 TABLET, FILM COATED ORAL 3 TIMES DAILY PRN
Qty: 90 TABLET | Refills: 0 | Status: SHIPPED | OUTPATIENT
Start: 2021-12-13

## 2021-12-13 RX ORDER — IVERMECTIN 3 MG/1
TABLET ORAL
Qty: 8 TABLET | Refills: 0 | Status: SHIPPED | OUTPATIENT
Start: 2021-12-13

## 2021-12-13 NOTE — TELEPHONE ENCOUNTER
Reports appt on 12/7 for scabies. Was prescribed Permethrin cream, she used on 12/8 and 12/9. Reports rash to her body improved but now has itching and spots to her legs. Reports is washing everything.  Asking if Permethrin cream can be given again or a cre

## 2022-01-05 ENCOUNTER — NURSE TRIAGE (OUTPATIENT)
Dept: FAMILY MEDICINE CLINIC | Facility: CLINIC | Age: 42
End: 2022-01-05

## 2022-01-05 DIAGNOSIS — Z20.822 COVID-19 RULED OUT BY CLINICAL CRITERIA: Primary | ICD-10-CM

## 2022-01-05 NOTE — TELEPHONE ENCOUNTER
Action Requested: Summary for Provider     []  Critical Lab, Recommendations Needed  [] Need Additional Advice  [x]   FYI    [x]   Need Orders  [] Need Medications Sent to Pharmacy  []  Other     SUMMARY: Patient stated that started with the following symp

## 2022-01-06 ENCOUNTER — NURSE ONLY (OUTPATIENT)
Dept: LAB | Facility: HOSPITAL | Age: 42
End: 2022-01-06
Attending: FAMILY MEDICINE
Payer: COMMERCIAL

## 2022-01-06 DIAGNOSIS — Z20.822 COVID-19 RULED OUT BY CLINICAL CRITERIA: ICD-10-CM

## 2022-01-08 LAB — SARS-COV-2 RNA RESP QL NAA+PROBE: DETECTED

## 2022-01-13 ENCOUNTER — TELEPHONE (OUTPATIENT)
Dept: FAMILY MEDICINE CLINIC | Facility: CLINIC | Age: 42
End: 2022-01-13

## 2022-01-13 NOTE — TELEPHONE ENCOUNTER
Patient calling, identified name and , asking about repeat COVID test.     Explained CDC guidelines,  She has been using the CDC website     Advised to call back with any questions/ concerns     Patient verbalizes understanding and agrees.

## 2022-01-21 NOTE — TELEPHONE ENCOUNTER
Patient states she wants to talk to either  or Emeterio Batista about note for work and changing her hours. Video appointment scheduled.       Future Appointments   Date Time Provider Brielle Morales   1/25/2022 12:30 PM NARA Maria

## 2022-01-25 ENCOUNTER — TELEMEDICINE (OUTPATIENT)
Dept: FAMILY MEDICINE CLINIC | Facility: CLINIC | Age: 42
End: 2022-01-25
Payer: COMMERCIAL

## 2022-01-25 DIAGNOSIS — Z02.89 ENCOUNTER FOR REVIEW OF FORM WITH PATIENT: Primary | ICD-10-CM

## 2022-01-25 PROCEDURE — 99213 OFFICE O/P EST LOW 20 MIN: CPT | Performed by: PHYSICIAN ASSISTANT

## 2022-01-25 NOTE — PROGRESS NOTES
HPI: HPI    I spoke to [address] via video call, verified date of birth, and discussed current concerns.     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

## 2022-02-04 ENCOUNTER — TELEPHONE (OUTPATIENT)
Dept: FAMILY MEDICINE CLINIC | Facility: CLINIC | Age: 42
End: 2022-02-04

## 2022-02-04 RX ORDER — BENZONATATE 200 MG/1
200 CAPSULE ORAL 3 TIMES DAILY PRN
Qty: 30 CAPSULE | Refills: 0 | Status: SHIPPED | OUTPATIENT
Start: 2022-02-04

## 2022-02-04 NOTE — TELEPHONE ENCOUNTER
Spoke to patient. She had Covid in January and still has a cough. She takes day time and night time cough medicine and as soon as it wears off, she starts coughing again. She is requesting a prescription. She would also like to drop off paperwork on Monday for provider to sign. Relayed that spouse can drop it off and they can let her know once it is ready for  but she can't wait for it because providers are seeing patients. Milly Pool, please advise on cough medicine. She said she forgot to tell you about this at your last video.

## 2022-02-07 ENCOUNTER — TELEPHONE (OUTPATIENT)
Dept: FAMILY MEDICINE CLINIC | Facility: CLINIC | Age: 42
End: 2022-02-07

## 2022-02-07 NOTE — TELEPHONE ENCOUNTER
Patient dropped off forms to be filled out. FCR and HIPPA forms completed. Patient also made processing payment.  Forms scanned to forms dept and original forms placed in [id number] drawer

## 2022-02-25 NOTE — TELEPHONE ENCOUNTER
Dr. Conchis Cope     Please sign off on 2 forms  -Highlight the patient and hit \"Chart\" button. -In Chart Review, w/in the Encounter tab - click 1 time on the Telephone call encounter for 02/07/22 Scroll down the telephone encounter.  -Click \"scan on\" blue Hyperlink under \"Media\" heading for RTW Dr. Conchis Cope 02/25/22 Disab Dr. Conchis Cope 02-25-22  w/in the telephone enc.  -Click on Acknowledge button at the bottom right corner and left-click onto image, signature stamp appears and drag signature to Provider signature line. Stamp will turn blue. Close window.      Thank you,    Merrill Bacon

## 2022-06-07 ENCOUNTER — OFFICE VISIT (OUTPATIENT)
Dept: FAMILY MEDICINE CLINIC | Facility: CLINIC | Age: 42
End: 2022-06-07
Payer: COMMERCIAL

## 2022-06-07 ENCOUNTER — NURSE TRIAGE (OUTPATIENT)
Dept: FAMILY MEDICINE CLINIC | Facility: CLINIC | Age: 42
End: 2022-06-07

## 2022-06-07 ENCOUNTER — LAB ENCOUNTER (OUTPATIENT)
Dept: LAB | Facility: HOSPITAL | Age: 42
End: 2022-06-07
Attending: PHYSICIAN ASSISTANT
Payer: COMMERCIAL

## 2022-06-07 VITALS
SYSTOLIC BLOOD PRESSURE: 114 MMHG | HEART RATE: 62 BPM | BODY MASS INDEX: 20.57 KG/M2 | WEIGHT: 128 LBS | HEIGHT: 66 IN | DIASTOLIC BLOOD PRESSURE: 72 MMHG

## 2022-06-07 DIAGNOSIS — D50.9 IRON DEFICIENCY ANEMIA, UNSPECIFIED IRON DEFICIENCY ANEMIA TYPE: ICD-10-CM

## 2022-06-07 DIAGNOSIS — R42 DIZZINESS: ICD-10-CM

## 2022-06-07 DIAGNOSIS — D50.9 IRON DEFICIENCY ANEMIA, UNSPECIFIED IRON DEFICIENCY ANEMIA TYPE: Primary | ICD-10-CM

## 2022-06-07 LAB
ALBUMIN SERPL-MCNC: 4.1 G/DL (ref 3.4–5)
ALBUMIN/GLOB SERPL: 1.1 {RATIO} (ref 1–2)
ALP LIVER SERPL-CCNC: 34 U/L
ALT SERPL-CCNC: 20 U/L
ANION GAP SERPL CALC-SCNC: 4 MMOL/L (ref 0–18)
AST SERPL-CCNC: 11 U/L (ref 15–37)
BILIRUB SERPL-MCNC: 1.4 MG/DL (ref 0.1–2)
BUN BLD-MCNC: 8 MG/DL (ref 7–18)
BUN/CREAT SERPL: 13.6 (ref 10–20)
CALCIUM BLD-MCNC: 8.4 MG/DL (ref 8.5–10.1)
CHLORIDE SERPL-SCNC: 104 MMOL/L (ref 98–112)
CO2 SERPL-SCNC: 29 MMOL/L (ref 21–32)
CREAT BLD-MCNC: 0.59 MG/DL
FASTING STATUS PATIENT QL REPORTED: NO
GLOBULIN PLAS-MCNC: 3.8 G/DL (ref 2.8–4.4)
GLUCOSE BLD-MCNC: 80 MG/DL (ref 70–99)
IRON SATN MFR SERPL: 33 %
IRON SERPL-MCNC: 94 UG/DL
OSMOLALITY SERPL CALC.SUM OF ELEC: 281 MOSM/KG (ref 275–295)
POTASSIUM SERPL-SCNC: 3.4 MMOL/L (ref 3.5–5.1)
PROT SERPL-MCNC: 7.9 G/DL (ref 6.4–8.2)
SODIUM SERPL-SCNC: 137 MMOL/L (ref 136–145)
T4 FREE SERPL-MCNC: 1 NG/DL (ref 0.8–1.7)
TIBC SERPL-MCNC: 285 UG/DL (ref 240–450)
TRANSFERRIN SERPL-MCNC: 191 MG/DL (ref 200–360)
TSI SER-ACNC: 4.5 MIU/ML (ref 0.36–3.74)
VIT B12 SERPL-MCNC: 272 PG/ML (ref 193–986)
VIT D+METAB SERPL-MCNC: 24 NG/ML (ref 30–100)

## 2022-06-07 PROCEDURE — 85060 BLOOD SMEAR INTERPRETATION: CPT

## 2022-06-07 PROCEDURE — 3078F DIAST BP <80 MM HG: CPT | Performed by: PHYSICIAN ASSISTANT

## 2022-06-07 PROCEDURE — 3008F BODY MASS INDEX DOCD: CPT | Performed by: PHYSICIAN ASSISTANT

## 2022-06-07 PROCEDURE — 93005 ELECTROCARDIOGRAM TRACING: CPT

## 2022-06-07 PROCEDURE — 85025 COMPLETE CBC W/AUTO DIFF WBC: CPT

## 2022-06-07 PROCEDURE — 36415 COLL VENOUS BLD VENIPUNCTURE: CPT

## 2022-06-07 PROCEDURE — 80053 COMPREHEN METABOLIC PANEL: CPT

## 2022-06-07 PROCEDURE — 83540 ASSAY OF IRON: CPT

## 2022-06-07 PROCEDURE — 84439 ASSAY OF FREE THYROXINE: CPT

## 2022-06-07 PROCEDURE — 93010 ELECTROCARDIOGRAM REPORT: CPT | Performed by: PHYSICIAN ASSISTANT

## 2022-06-07 PROCEDURE — 84443 ASSAY THYROID STIM HORMONE: CPT

## 2022-06-07 PROCEDURE — 82607 VITAMIN B-12: CPT

## 2022-06-07 PROCEDURE — 84466 ASSAY OF TRANSFERRIN: CPT

## 2022-06-07 PROCEDURE — 82306 VITAMIN D 25 HYDROXY: CPT

## 2022-06-07 PROCEDURE — 99213 OFFICE O/P EST LOW 20 MIN: CPT | Performed by: PHYSICIAN ASSISTANT

## 2022-06-07 PROCEDURE — 3074F SYST BP LT 130 MM HG: CPT | Performed by: PHYSICIAN ASSISTANT

## 2022-06-08 LAB
BASOPHILS # BLD AUTO: 0.02 X10(3) UL (ref 0–0.2)
BASOPHILS NFR BLD AUTO: 0.3 %
DEPRECATED RDW RBC AUTO: 46.6 FL (ref 35.1–46.3)
EOSINOPHIL # BLD AUTO: 0.17 X10(3) UL (ref 0–0.7)
EOSINOPHIL NFR BLD AUTO: 2.8 %
ERYTHROCYTE [DISTWIDTH] IN BLOOD BY AUTOMATED COUNT: 24 % (ref 11–15)
HCT VFR BLD AUTO: 30.8 %
HGB BLD-MCNC: 8.9 G/DL
IMM GRANULOCYTES # BLD AUTO: 0.02 X10(3) UL (ref 0–1)
IMM GRANULOCYTES NFR BLD: 0.3 %
LYMPHOCYTES # BLD AUTO: 2.39 X10(3) UL (ref 1–4)
LYMPHOCYTES NFR BLD AUTO: 40 %
MCH RBC QN AUTO: 17 PG (ref 26–34)
MCHC RBC AUTO-ENTMCNC: 28.9 G/DL (ref 31–37)
MCV RBC AUTO: 58.7 FL
MONOCYTES # BLD AUTO: 0.39 X10(3) UL (ref 0.1–1)
MONOCYTES NFR BLD AUTO: 6.5 %
NEUTROPHILS # BLD AUTO: 2.99 X10 (3) UL (ref 1.5–7.7)
NEUTROPHILS # BLD AUTO: 2.99 X10(3) UL (ref 1.5–7.7)
NEUTROPHILS NFR BLD AUTO: 50.1 %
PLATELET # BLD AUTO: 223 10(3)UL (ref 150–450)
PLATELET MORPHOLOGY: NORMAL
RBC # BLD AUTO: 5.25 X10(6)UL
WBC # BLD AUTO: 6 X10(3) UL (ref 4–11)

## 2022-06-09 ENCOUNTER — NURSE ONLY (OUTPATIENT)
Dept: FAMILY MEDICINE CLINIC | Facility: CLINIC | Age: 42
End: 2022-06-09
Payer: COMMERCIAL

## 2022-06-09 ENCOUNTER — TELEPHONE (OUTPATIENT)
Dept: HEMATOLOGY/ONCOLOGY | Facility: HOSPITAL | Age: 42
End: 2022-06-09

## 2022-06-09 DIAGNOSIS — E53.8 VITAMIN B12 DEFICIENCY: Primary | ICD-10-CM

## 2022-06-09 PROCEDURE — 96372 THER/PROPH/DIAG INJ SC/IM: CPT | Performed by: PHYSICIAN ASSISTANT

## 2022-06-09 RX ORDER — CYANOCOBALAMIN 1000 UG/ML
1000 INJECTION INTRAMUSCULAR; SUBCUTANEOUS
Status: SHIPPED | OUTPATIENT
Start: 2022-06-09

## 2022-06-09 RX ADMIN — CYANOCOBALAMIN 1000 MCG: 1000 INJECTION INTRAMUSCULAR; SUBCUTANEOUS at 16:05:00

## 2022-06-09 NOTE — TELEPHONE ENCOUNTER
Called patient, per Yaakov Ramirez, patient does not need transfusion and iron normal, she states ear okay per PCP. She will continue with Vit B 12.

## 2022-06-09 NOTE — TELEPHONE ENCOUNTER
Patient called and has been feeling tired. Her PCP told her to ask about a transfusion. Please call when able. Thank you.

## 2022-06-23 ENCOUNTER — OFFICE VISIT (OUTPATIENT)
Dept: FAMILY MEDICINE CLINIC | Facility: CLINIC | Age: 42
End: 2022-06-23
Payer: COMMERCIAL

## 2022-06-23 ENCOUNTER — TELEPHONE (OUTPATIENT)
Dept: FAMILY MEDICINE CLINIC | Facility: CLINIC | Age: 42
End: 2022-06-23

## 2022-06-23 VITALS
WEIGHT: 127 LBS | BODY MASS INDEX: 20.41 KG/M2 | HEIGHT: 66 IN | DIASTOLIC BLOOD PRESSURE: 58 MMHG | SYSTOLIC BLOOD PRESSURE: 96 MMHG | HEART RATE: 73 BPM

## 2022-06-23 DIAGNOSIS — Z12.31 SCREENING MAMMOGRAM, ENCOUNTER FOR: Primary | ICD-10-CM

## 2022-06-23 RX ORDER — PREDNISONE 10 MG/1
10 TABLET ORAL 3 TIMES DAILY
Qty: 12 TABLET | Refills: 0 | Status: SHIPPED | OUTPATIENT
Start: 2022-06-23

## 2022-06-23 RX ORDER — LEVOCETIRIZINE DIHYDROCHLORIDE 5 MG/1
5 TABLET, FILM COATED ORAL EVERY EVENING
Qty: 30 TABLET | Refills: 2 | Status: SHIPPED | OUTPATIENT
Start: 2022-06-23 | End: 2022-06-23

## 2022-06-23 NOTE — PROGRESS NOTES
Blood pressure 96/58, pulse 73, height 5' 6\" (1.676 m), weight 127 lb (57.6 kg), last menstrual period 05/30/2022. Patient presents today complaining of itching all over her body. She does use a new lotion. She spends a lot of time in her backyard playing with her son. She plays with them every day. She works in a factory. No new soaps or detergents. No recent travel. Has had issues with itching skin before. No lesions on the breasts or in the gluteal area.   Also no itching in the vaginal area  Objective scattered papules noted    Trunk and extremities    No wrist area lesions no antecubital area or lesions    Assessment dermatitis contact    No relief with Claritin    Plan prednisone prescription risks and benefits explained follow-up if no improvement

## 2022-06-30 ENCOUNTER — TELEPHONE (OUTPATIENT)
Dept: HEMATOLOGY/ONCOLOGY | Facility: HOSPITAL | Age: 42
End: 2022-06-30

## 2022-06-30 NOTE — TELEPHONE ENCOUNTER
Patient called she is having decreased energy, feeling drained, body feels heavy and wondering about getting an infusion to improve symptoms. Please advise.

## 2022-06-30 NOTE — TELEPHONE ENCOUNTER
Called patient that most recent labs did not indicate an infusion was needed, to get labs and make apt with Dr Storm Blizzard or consult with PCP with B12 injection apt. She verbalizes understanding.

## 2022-07-09 ENCOUNTER — TELEPHONE (OUTPATIENT)
Dept: FAMILY MEDICINE CLINIC | Facility: CLINIC | Age: 42
End: 2022-07-09

## 2022-07-09 ENCOUNTER — NURSE ONLY (OUTPATIENT)
Dept: FAMILY MEDICINE CLINIC | Facility: CLINIC | Age: 42
End: 2022-07-09
Payer: COMMERCIAL

## 2022-07-09 DIAGNOSIS — E53.8 VITAMIN B12 DEFICIENCY: Primary | ICD-10-CM

## 2022-07-09 PROCEDURE — 96372 THER/PROPH/DIAG INJ SC/IM: CPT | Performed by: PHYSICIAN ASSISTANT

## 2022-07-09 RX ADMIN — CYANOCOBALAMIN 1000 MCG: 1000 INJECTION INTRAMUSCULAR; SUBCUTANEOUS at 10:21:00

## 2022-07-14 ENCOUNTER — HOSPITAL ENCOUNTER (OUTPATIENT)
Dept: MAMMOGRAPHY | Age: 42
Discharge: HOME | End: 2022-07-14
Attending: FAMILY MEDICINE
Payer: COMMERCIAL

## 2022-07-14 DIAGNOSIS — Z12.31 SCREENING MAMMOGRAM, ENCOUNTER FOR: ICD-10-CM

## 2022-07-14 PROCEDURE — 77067 SCR MAMMO BI INCL CAD: CPT | Performed by: FAMILY MEDICINE

## 2022-07-14 PROCEDURE — 77063 BREAST TOMOSYNTHESIS BI: CPT | Performed by: FAMILY MEDICINE

## 2022-07-25 ENCOUNTER — NURSE TRIAGE (OUTPATIENT)
Dept: FAMILY MEDICINE CLINIC | Facility: CLINIC | Age: 42
End: 2022-07-25

## 2022-07-26 ENCOUNTER — OFFICE VISIT (OUTPATIENT)
Dept: FAMILY MEDICINE CLINIC | Facility: CLINIC | Age: 42
End: 2022-07-26
Payer: COMMERCIAL

## 2022-07-26 VITALS
WEIGHT: 130 LBS | DIASTOLIC BLOOD PRESSURE: 62 MMHG | BODY MASS INDEX: 20.89 KG/M2 | SYSTOLIC BLOOD PRESSURE: 100 MMHG | HEART RATE: 62 BPM | HEIGHT: 66 IN

## 2022-07-26 DIAGNOSIS — M62.830 SPASM OF BACK MUSCLES: Primary | ICD-10-CM

## 2022-07-26 PROCEDURE — 3078F DIAST BP <80 MM HG: CPT | Performed by: PHYSICIAN ASSISTANT

## 2022-07-26 PROCEDURE — 99213 OFFICE O/P EST LOW 20 MIN: CPT | Performed by: PHYSICIAN ASSISTANT

## 2022-07-26 PROCEDURE — 3074F SYST BP LT 130 MM HG: CPT | Performed by: PHYSICIAN ASSISTANT

## 2022-07-26 PROCEDURE — 3008F BODY MASS INDEX DOCD: CPT | Performed by: PHYSICIAN ASSISTANT

## 2022-07-26 RX ORDER — NAPROXEN 500 MG/1
500 TABLET ORAL 2 TIMES DAILY WITH MEALS
Qty: 60 TABLET | Refills: 0 | Status: SHIPPED | OUTPATIENT
Start: 2022-07-26 | End: 2022-08-25

## 2022-07-26 RX ORDER — LEVOCETIRIZINE DIHYDROCHLORIDE 5 MG/1
TABLET, FILM COATED ORAL
COMMUNITY
Start: 2022-06-23

## 2022-09-27 ENCOUNTER — TELEPHONE (OUTPATIENT)
Dept: FAMILY MEDICINE CLINIC | Facility: CLINIC | Age: 42
End: 2022-09-27

## 2022-09-27 ENCOUNTER — APPOINTMENT (OUTPATIENT)
Dept: LAB | Age: 42
End: 2022-09-27
Attending: FAMILY MEDICINE
Payer: COMMERCIAL

## 2022-09-27 ENCOUNTER — LAB ENCOUNTER (OUTPATIENT)
Dept: LAB | Age: 42
End: 2022-09-27
Attending: FAMILY MEDICINE
Payer: COMMERCIAL

## 2022-09-27 DIAGNOSIS — Z20.822 COVID-19 VIRUS TEST RESULT UNKNOWN: Primary | ICD-10-CM

## 2022-09-27 DIAGNOSIS — Z20.822 COVID-19 VIRUS TEST RESULT UNKNOWN: ICD-10-CM

## 2022-09-27 NOTE — TELEPHONE ENCOUNTER
Patient requesting a PCR order for travel. Leaving this Sunday out of the country. States airline is requesting the test. Order pended for you to review and sign off if appropriate. Thank you.

## 2022-09-28 LAB — SARS-COV-2 RNA RESP QL NAA+PROBE: NOT DETECTED

## 2022-09-29 ENCOUNTER — TELEPHONE (OUTPATIENT)
Dept: FAMILY MEDICINE CLINIC | Facility: CLINIC | Age: 42
End: 2022-09-29

## 2022-09-29 NOTE — TELEPHONE ENCOUNTER
Per patient she has Blood work orders in her Chart and need to know when to do it and also patient would like to know when to come for her B12 shot.

## 2022-09-29 NOTE — TELEPHONE ENCOUNTER
Spoke to patient and informed needs to have b12 injection monthly per order. And labs can be completed anytime. Were to be repeated 2 months after June 2022. Lucy Saavedra Pt verbalized understanding.

## 2022-10-01 ENCOUNTER — LAB ENCOUNTER (OUTPATIENT)
Dept: LAB | Age: 42
End: 2022-10-01
Attending: PHYSICIAN ASSISTANT
Payer: COMMERCIAL

## 2022-10-01 ENCOUNTER — NURSE ONLY (OUTPATIENT)
Dept: FAMILY MEDICINE CLINIC | Facility: CLINIC | Age: 42
End: 2022-10-01
Payer: COMMERCIAL

## 2022-10-01 DIAGNOSIS — E53.8 VITAMIN B12 DEFICIENCY: ICD-10-CM

## 2022-10-01 DIAGNOSIS — E53.8 VITAMIN B12 DEFICIENCY: Primary | ICD-10-CM

## 2022-10-01 DIAGNOSIS — E03.8 SUBCLINICAL HYPOTHYROIDISM: ICD-10-CM

## 2022-10-01 LAB
T3FREE SERPL-MCNC: 3.05 PG/ML (ref 2.4–4.2)
T4 FREE SERPL-MCNC: 1 NG/DL (ref 0.8–1.7)
TSI SER-ACNC: 2.11 MIU/ML (ref 0.36–3.74)
VIT B12 SERPL-MCNC: >2000 PG/ML (ref 193–986)

## 2022-10-01 PROCEDURE — 84439 ASSAY OF FREE THYROXINE: CPT

## 2022-10-01 PROCEDURE — 84481 FREE ASSAY (FT-3): CPT

## 2022-10-01 PROCEDURE — 84443 ASSAY THYROID STIM HORMONE: CPT

## 2022-10-01 PROCEDURE — 82607 VITAMIN B-12: CPT

## 2022-10-01 PROCEDURE — 36415 COLL VENOUS BLD VENIPUNCTURE: CPT

## 2022-10-01 RX ADMIN — CYANOCOBALAMIN 1000 MCG: 1000 INJECTION INTRAMUSCULAR; SUBCUTANEOUS at 08:51:00

## 2022-11-16 ENCOUNTER — TELEPHONE (OUTPATIENT)
Dept: HEMATOLOGY/ONCOLOGY | Facility: HOSPITAL | Age: 42
End: 2022-11-16

## 2022-11-16 ENCOUNTER — LAB ENCOUNTER (OUTPATIENT)
Dept: LAB | Facility: HOSPITAL | Age: 42
End: 2022-11-16
Attending: INTERNAL MEDICINE
Payer: COMMERCIAL

## 2022-11-16 DIAGNOSIS — D56.0 HEMOGLOBIN H DISEASE (HCC): ICD-10-CM

## 2022-11-16 DIAGNOSIS — E53.8 B12 DEFICIENCY: ICD-10-CM

## 2022-11-16 DIAGNOSIS — D56.9 THALASSEMIA, UNSPECIFIED TYPE: ICD-10-CM

## 2022-11-16 DIAGNOSIS — D50.9 MICROCYTIC ANEMIA: ICD-10-CM

## 2022-11-16 LAB
BASOPHILS # BLD AUTO: 0.02 X10(3) UL (ref 0–0.2)
BASOPHILS NFR BLD AUTO: 0.3 %
DEPRECATED HBV CORE AB SER IA-ACNC: 63.7 NG/ML
DEPRECATED RDW RBC AUTO: 46.2 FL (ref 35.1–46.3)
EOSINOPHIL # BLD AUTO: 0.22 X10(3) UL (ref 0–0.7)
EOSINOPHIL NFR BLD AUTO: 3.8 %
ERYTHROCYTE [DISTWIDTH] IN BLOOD BY AUTOMATED COUNT: 25 % (ref 11–15)
HCT VFR BLD AUTO: 29.6 %
HGB BLD-MCNC: 8.6 G/DL
IMM GRANULOCYTES # BLD AUTO: 0.01 X10(3) UL (ref 0–1)
IMM GRANULOCYTES NFR BLD: 0.2 %
IRON SATN MFR SERPL: 16 %
IRON SERPL-MCNC: 44 UG/DL
LYMPHOCYTES # BLD AUTO: 2.13 X10(3) UL (ref 1–4)
LYMPHOCYTES NFR BLD AUTO: 37.2 %
MCH RBC QN AUTO: 16.6 PG (ref 26–34)
MCHC RBC AUTO-ENTMCNC: 29.1 G/DL (ref 31–37)
MCV RBC AUTO: 57.1 FL
MONOCYTES # BLD AUTO: 0.38 X10(3) UL (ref 0.1–1)
MONOCYTES NFR BLD AUTO: 6.6 %
NEUTROPHILS # BLD AUTO: 2.96 X10 (3) UL (ref 1.5–7.7)
NEUTROPHILS # BLD AUTO: 2.96 X10(3) UL (ref 1.5–7.7)
NEUTROPHILS NFR BLD AUTO: 51.9 %
PLATELET # BLD AUTO: 234 10(3)UL (ref 150–450)
PLATELET MORPHOLOGY: NORMAL
RBC # BLD AUTO: 5.18 X10(6)UL
TIBC SERPL-MCNC: 268 UG/DL (ref 240–450)
TRANSFERRIN SERPL-MCNC: 180 MG/DL (ref 200–360)
VIT B12 SERPL-MCNC: 262 PG/ML (ref 193–986)
WBC # BLD AUTO: 5.7 X10(3) UL (ref 4–11)

## 2022-11-16 PROCEDURE — 83540 ASSAY OF IRON: CPT

## 2022-11-16 PROCEDURE — 82728 ASSAY OF FERRITIN: CPT

## 2022-11-16 PROCEDURE — 82607 VITAMIN B-12: CPT

## 2022-11-16 PROCEDURE — 84466 ASSAY OF TRANSFERRIN: CPT

## 2022-11-16 PROCEDURE — 85025 COMPLETE CBC W/AUTO DIFF WBC: CPT

## 2022-11-16 PROCEDURE — 36415 COLL VENOUS BLD VENIPUNCTURE: CPT

## 2022-11-16 NOTE — TELEPHONE ENCOUNTER
Informed patient of appointment tomorrow with Dr. Juan Manuel Mckee. Informed patient she will need to have labs drawn at [facility] before her appointment. Patient stated understanding and thanked me for calling.

## 2022-11-17 ENCOUNTER — OFFICE VISIT (OUTPATIENT)
Dept: HEMATOLOGY/ONCOLOGY | Facility: HOSPITAL | Age: 42
End: 2022-11-17
Attending: INTERNAL MEDICINE
Payer: COMMERCIAL

## 2022-11-17 VITALS
DIASTOLIC BLOOD PRESSURE: 45 MMHG | SYSTOLIC BLOOD PRESSURE: 103 MMHG | WEIGHT: 126.81 LBS | TEMPERATURE: 98 F | RESPIRATION RATE: 16 BRPM | OXYGEN SATURATION: 100 % | HEIGHT: 66 IN | HEART RATE: 57 BPM | BODY MASS INDEX: 20.38 KG/M2

## 2022-11-17 DIAGNOSIS — D50.9 MICROCYTIC ANEMIA: ICD-10-CM

## 2022-11-17 DIAGNOSIS — E53.8 B12 DEFICIENCY: ICD-10-CM

## 2022-11-17 DIAGNOSIS — D56.0 HEMOGLOBIN H DISEASE (HCC): Primary | ICD-10-CM

## 2022-11-17 PROCEDURE — 99214 OFFICE O/P EST MOD 30 MIN: CPT | Performed by: INTERNAL MEDICINE

## 2023-02-24 ENCOUNTER — NURSE TRIAGE (OUTPATIENT)
Dept: FAMILY MEDICINE CLINIC | Facility: CLINIC | Age: 43
End: 2023-02-24

## 2023-02-27 ENCOUNTER — OFFICE VISIT (OUTPATIENT)
Dept: FAMILY MEDICINE CLINIC | Facility: CLINIC | Age: 43
End: 2023-02-27

## 2023-02-27 VITALS
BODY MASS INDEX: 20.89 KG/M2 | SYSTOLIC BLOOD PRESSURE: 96 MMHG | HEIGHT: 66 IN | DIASTOLIC BLOOD PRESSURE: 59 MMHG | HEART RATE: 66 BPM | WEIGHT: 130 LBS

## 2023-02-27 DIAGNOSIS — D50.9 IRON DEFICIENCY ANEMIA, UNSPECIFIED IRON DEFICIENCY ANEMIA TYPE: Primary | ICD-10-CM

## 2023-02-27 PROCEDURE — 3078F DIAST BP <80 MM HG: CPT | Performed by: FAMILY MEDICINE

## 2023-02-27 PROCEDURE — 3008F BODY MASS INDEX DOCD: CPT | Performed by: FAMILY MEDICINE

## 2023-02-27 PROCEDURE — 3074F SYST BP LT 130 MM HG: CPT | Performed by: FAMILY MEDICINE

## 2023-02-27 PROCEDURE — 99213 OFFICE O/P EST LOW 20 MIN: CPT | Performed by: FAMILY MEDICINE

## 2023-02-27 RX ORDER — NAPROXEN 500 MG/1
500 TABLET ORAL 2 TIMES DAILY WITH MEALS
Qty: 60 TABLET | Refills: 1 | Status: SHIPPED | OUTPATIENT
Start: 2023-02-27 | End: 2023-03-20

## 2023-02-27 NOTE — PROGRESS NOTES
Blood pressure 96/59, pulse 66, height 5' 6\" (1.676 m), weight 130 lb (59 kg), last menstrual period 06/28/2022. Patient presents today following up for left upper thoracic back pain. 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 No trauma no pain down the arms. Pain can keep her awake at times. Also concerned about her anemia. Denies heavy menses or blood in stool. Objective last hemoglobin level 8.6.     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

## 2023-07-25 ENCOUNTER — NURSE TRIAGE (OUTPATIENT)
Dept: FAMILY MEDICINE CLINIC | Facility: CLINIC | Age: 43
End: 2023-07-25

## 2023-07-25 NOTE — TELEPHONE ENCOUNTER
Action Requested: Summary for Provider     []  Critical Lab, Recommendations Needed  [] Need Additional Advice  [x]   FYI    []   Need Orders  [] Need Medications Sent to Pharmacy  []  Other     SUMMARY:   Spoke with pt,  verified, pt stated she felt weird this week. Pt c/o dizziness, unbalanced  all week, sometimes feel numb on her head, weakness, tired, her feet and hands are cold. Yesterday  she had sharp chest pain lasted 2-5 mins at home but when she laid down she feel better. Last year she had heart ultrasound that was ok. Pt also has left sided back pain, if she had back pain, she can't straight her back up. Yesterday she felt weak, discomfort on her head, back pain, got pain meds (Naproxen) from Dr. Macey Crockett last 2023, with minimal relief. When she takes the meds (Naproxen) she feel itchy, pt advised don't take Naproxen as she might developing allergies to meds. She wants to know if her B12 level is low that makes her tired. Pt has h/o anemia. Pt denies chest pain at the time of call, shortness of breath, headache, blurred vision, numbness/ tingling, no other sx. She want her B 12  check, she is looking for appt with PCP. Per protocol, pt was advised if sx persist or gets worse to go to ER/ IC, she agreed and stated understanding. Pt will consider going to IC ADO.      Reason for call: dizziness  Onset: a week      Reason for Disposition   Chest pain lasting longer than 5 minutes and occurred in last 3 days (72 hours) (Exception: feels exactly the same as previously diagnosed heartburn and has accompanying sour taste in mouth)    Protocols used: Chest Pain-A-[place]    Future Appointments   Date Time Provider Brielle Morales   2023  9:00 AM Tristen Kat MD [address] [place] [address] HEM ONC EMO

## 2023-07-26 ENCOUNTER — HOSPITAL ENCOUNTER (OUTPATIENT)
Age: 43
Discharge: HOME | End: 2023-07-26
Payer: COMMERCIAL

## 2023-07-26 VITALS
OXYGEN SATURATION: 99 % | HEART RATE: 62 BPM | RESPIRATION RATE: 16 BRPM | WEIGHT: 124 LBS | BODY MASS INDEX: 19.93 KG/M2 | TEMPERATURE: 98 F | SYSTOLIC BLOOD PRESSURE: 113 MMHG | HEIGHT: 66 IN | DIASTOLIC BLOOD PRESSURE: 78 MMHG

## 2023-07-26 DIAGNOSIS — D50.9 IRON DEFICIENCY ANEMIA, UNSPECIFIED IRON DEFICIENCY ANEMIA TYPE: ICD-10-CM

## 2023-07-26 DIAGNOSIS — G89.29 UPPER BACK PAIN, CHRONIC: ICD-10-CM

## 2023-07-26 DIAGNOSIS — M54.9 UPPER BACK PAIN, CHRONIC: ICD-10-CM

## 2023-07-26 DIAGNOSIS — R53.83 OTHER FATIGUE: Primary | ICD-10-CM

## 2023-07-26 LAB
#MXD IC: 0.3 X10ˆ3/UL (ref 0.1–1)
B-HCG UR QL: NEGATIVE
BASOPHILS # BLD AUTO: 0.02 X10(3) UL (ref 0–0.2)
BASOPHILS NFR BLD AUTO: 0.4 %
BILIRUB UR QL STRIP: NEGATIVE
BUN BLD-MCNC: 8 MG/DL (ref 7–18)
CHLORIDE BLD-SCNC: 103 MMOL/L (ref 98–112)
CLARITY UR: CLEAR
CO2 BLD-SCNC: 27 MMOL/L (ref 21–32)
CREAT BLD-MCNC: 0.5 MG/DL
EGFRCR SERPLBLD CKD-EPI 2021: 121 ML/MIN/1.73M2 (ref 60–?)
EOSINOPHIL # BLD AUTO: 0.13 X10(3) UL (ref 0–0.7)
EOSINOPHIL NFR BLD AUTO: 2.4 %
ERYTHROCYTE [DISTWIDTH] IN BLOOD BY AUTOMATED COUNT: 24.6 %
GLUCOSE BLD-MCNC: 84 MG/DL (ref 70–99)
GLUCOSE UR STRIP-MCNC: NEGATIVE MG/DL
HCT VFR BLD AUTO: 29.6 %
HCT VFR BLD AUTO: 31.1 %
HCT VFR BLD CALC: 37 %
HGB BLD-MCNC: 8.6 G/DL
HGB BLD-MCNC: 8.7 G/DL
HGB UR QL STRIP: NEGATIVE
IMM GRANULOCYTES # BLD AUTO: 0.02 X10(3) UL (ref 0–1)
IMM GRANULOCYTES NFR BLD: 0.4 %
ISTAT IONIZED CALCIUM FOR CHEM 8: 1.19 MMOL/L (ref 1.12–1.32)
KETONES UR STRIP-MCNC: NEGATIVE MG/DL
LEUKOCYTE ESTERASE UR QL STRIP: NEGATIVE
LYMPHOCYTES # BLD AUTO: 2.1 X10(3) UL (ref 1–4)
LYMPHOCYTES # BLD AUTO: 2.3 X10ˆ3/UL (ref 1–4)
LYMPHOCYTES NFR BLD AUTO: 38 %
LYMPHOCYTES NFR BLD AUTO: 41.6 %
MCH RBC QN AUTO: 16.7 PG (ref 26–34)
MCH RBC QN AUTO: <20 PG (ref 26–34)
MCHC RBC AUTO-ENTMCNC: 27.7 G/DL (ref 31–37)
MCHC RBC AUTO-ENTMCNC: 29.4 G/DL (ref 31–37)
MCV RBC AUTO: 56.9 FL
MCV RBC AUTO: 59.9 FL (ref 80–100)
MIXED CELL %: 5.6 %
MONOCYTES # BLD AUTO: 0.34 X10(3) UL (ref 0.1–1)
MONOCYTES NFR BLD AUTO: 6.2 %
NEUTROPHILS # BLD AUTO: 2.91 X10 (3) UL (ref 1.5–7.7)
NEUTROPHILS # BLD AUTO: 2.91 X10(3) UL (ref 1.5–7.7)
NEUTROPHILS # BLD AUTO: 3 X10ˆ3/UL (ref 1.5–7.7)
NEUTROPHILS NFR BLD AUTO: 52.6 %
NEUTROPHILS NFR BLD AUTO: 52.8 %
NITRITE UR QL STRIP: NEGATIVE
PH UR STRIP: 7 [PH]
PLATELET # BLD AUTO: 228 10(3)UL (ref 150–450)
PLATELET MORPHOLOGY: NORMAL
POTASSIUM BLD-SCNC: 3.9 MMOL/L (ref 3.6–5.1)
PROT UR STRIP-MCNC: NEGATIVE MG/DL
RBC # BLD AUTO: 5.2 X10(6)UL
SODIUM BLD-SCNC: 141 MMOL/L (ref 136–145)
SP GR UR STRIP: 1.02
UROBILINOGEN UR STRIP-ACNC: <2 MG/DL
WBC # BLD AUTO: 5.5 X10(3) UL (ref 4–11)
WBC # BLD AUTO: 5.6 X10ˆ3/UL (ref 4–11)

## 2023-07-26 PROCEDURE — 81002 URINALYSIS NONAUTO W/O SCOPE: CPT | Performed by: NURSE PRACTITIONER

## 2023-07-26 PROCEDURE — 81025 URINE PREGNANCY TEST: CPT | Performed by: NURSE PRACTITIONER

## 2023-07-26 PROCEDURE — 85025 COMPLETE CBC W/AUTO DIFF WBC: CPT | Performed by: NURSE PRACTITIONER

## 2023-07-26 PROCEDURE — 80047 BASIC METABLC PNL IONIZED CA: CPT | Performed by: NURSE PRACTITIONER

## 2023-07-26 PROCEDURE — 93000 ELECTROCARDIOGRAM COMPLETE: CPT | Performed by: NURSE PRACTITIONER

## 2023-07-26 PROCEDURE — 99214 OFFICE O/P EST MOD 30 MIN: CPT | Performed by: NURSE PRACTITIONER

## 2023-07-26 NOTE — ED INITIAL ASSESSMENT (HPI)
Patient reports having back pain for about a year, however the pain has worsened this past week along with fatigue and weakness. Patient states she feels like she has no energy. Patient also states she feels light headed when she gets up to stand and feels unbalanced. Patient also reports having some intermittent chest pain with some heaviness within the last week as well.

## 2023-07-27 ENCOUNTER — APPOINTMENT (OUTPATIENT)
Dept: GENERAL RADIOLOGY | Facility: HOSPITAL | Age: 43
End: 2023-07-27
Payer: COMMERCIAL

## 2023-07-27 ENCOUNTER — HOSPITAL ENCOUNTER (EMERGENCY)
Facility: HOSPITAL | Age: 43
Discharge: HOME | End: 2023-07-27
Attending: EMERGENCY MEDICINE
Payer: COMMERCIAL

## 2023-07-27 ENCOUNTER — OFFICE VISIT (OUTPATIENT)
Dept: FAMILY MEDICINE CLINIC | Facility: CLINIC | Age: 43
End: 2023-07-27

## 2023-07-27 VITALS
HEIGHT: 66 IN | WEIGHT: 127 LBS | SYSTOLIC BLOOD PRESSURE: 99 MMHG | DIASTOLIC BLOOD PRESSURE: 69 MMHG | BODY MASS INDEX: 20.41 KG/M2 | HEART RATE: 59 BPM

## 2023-07-27 VITALS
HEART RATE: 57 BPM | OXYGEN SATURATION: 99 % | SYSTOLIC BLOOD PRESSURE: 120 MMHG | BODY MASS INDEX: 19.93 KG/M2 | TEMPERATURE: 99 F | HEIGHT: 66 IN | WEIGHT: 124 LBS | DIASTOLIC BLOOD PRESSURE: 56 MMHG | RESPIRATION RATE: 18 BRPM

## 2023-07-27 DIAGNOSIS — Z82.79 FAMILY HISTORY OF CONGENITAL HEART DISEASE: ICD-10-CM

## 2023-07-27 DIAGNOSIS — R53.83 OTHER FATIGUE: Primary | ICD-10-CM

## 2023-07-27 DIAGNOSIS — E61.1 IRON DEFICIENCY: ICD-10-CM

## 2023-07-27 DIAGNOSIS — R06.09 DOE (DYSPNEA ON EXERTION): ICD-10-CM

## 2023-07-27 DIAGNOSIS — E03.9 HYPOTHYROIDISM, UNSPECIFIED TYPE: ICD-10-CM

## 2023-07-27 LAB
ANION GAP SERPL CALC-SCNC: 6 MMOL/L (ref 0–18)
ANTIBODY SCREEN: NEGATIVE
ATRIAL RATE: 48 BPM
BASOPHILS # BLD AUTO: 0.03 X10(3) UL (ref 0–0.2)
BASOPHILS NFR BLD AUTO: 0.5 %
BUN BLD-MCNC: 9 MG/DL (ref 7–18)
BUN/CREAT SERPL: 12.7 (ref 10–20)
CALCIUM BLD-MCNC: 9 MG/DL (ref 8.5–10.1)
CHLORIDE SERPL-SCNC: 107 MMOL/L (ref 98–112)
CO2 SERPL-SCNC: 27 MMOL/L (ref 21–32)
CREAT BLD-MCNC: 0.71 MG/DL
DEPRECATED RDW RBC AUTO: 45.4 FL (ref 35.1–46.3)
EGFRCR SERPLBLD CKD-EPI 2021: 109 ML/MIN/1.73M2 (ref 60–?)
EOSINOPHIL # BLD AUTO: 0.15 X10(3) UL (ref 0–0.7)
EOSINOPHIL NFR BLD AUTO: 2.3 %
ERYTHROCYTE [DISTWIDTH] IN BLOOD BY AUTOMATED COUNT: 24.9 % (ref 11–15)
GLUCOSE BLD-MCNC: 96 MG/DL (ref 70–99)
HCT VFR BLD AUTO: 30.2 %
HGB BLD-MCNC: 9 G/DL
IMM GRANULOCYTES # BLD AUTO: 0.01 X10(3) UL (ref 0–1)
IMM GRANULOCYTES NFR BLD: 0.2 %
LYMPHOCYTES # BLD AUTO: 2.56 X10(3) UL (ref 1–4)
LYMPHOCYTES NFR BLD AUTO: 39 %
MCH RBC QN AUTO: 17 PG (ref 26–34)
MCHC RBC AUTO-ENTMCNC: 29.8 G/DL (ref 31–37)
MCV RBC AUTO: 57 FL
MONOCYTES # BLD AUTO: 0.41 X10(3) UL (ref 0.1–1)
MONOCYTES NFR BLD AUTO: 6.2 %
NEUTROPHILS # BLD AUTO: 3.41 X10 (3) UL (ref 1.5–7.7)
NEUTROPHILS # BLD AUTO: 3.41 X10(3) UL (ref 1.5–7.7)
NEUTROPHILS NFR BLD AUTO: 51.8 %
OSMOLALITY SERPL CALC.SUM OF ELEC: 289 MOSM/KG (ref 275–295)
P AXIS: 36 DEGREES
P-R INTERVAL: 174 MS
PLATELET # BLD AUTO: 246 10(3)UL (ref 150–450)
POTASSIUM SERPL-SCNC: 3.9 MMOL/L (ref 3.5–5.1)
Q-T INTERVAL: 460 MS
QRS DURATION: 86 MS
QTC CALCULATION (BEZET): 410 MS
R AXIS: 62 DEGREES
RBC # BLD AUTO: 5.3 X10(6)UL
RH BLOOD TYPE: POSITIVE
SARS-COV-2 RNA RESP QL NAA+PROBE: NOT DETECTED
SODIUM SERPL-SCNC: 140 MMOL/L (ref 136–145)
T AXIS: 54 DEGREES
T4 FREE SERPL-MCNC: 1.1 NG/DL (ref 0.8–1.7)
TSI SER-ACNC: 7.09 MIU/ML (ref 0.36–3.74)
VENTRICULAR RATE: 48 BPM
WBC # BLD AUTO: 6.6 X10(3) UL (ref 4–11)

## 2023-07-27 PROCEDURE — 93005 ELECTROCARDIOGRAM TRACING: CPT

## 2023-07-27 PROCEDURE — 86900 BLOOD TYPING SEROLOGIC ABO: CPT | Performed by: EMERGENCY MEDICINE

## 2023-07-27 PROCEDURE — 86901 BLOOD TYPING SEROLOGIC RH(D): CPT

## 2023-07-27 PROCEDURE — 80048 BASIC METABOLIC PNL TOTAL CA: CPT | Performed by: EMERGENCY MEDICINE

## 2023-07-27 PROCEDURE — 86850 RBC ANTIBODY SCREEN: CPT

## 2023-07-27 PROCEDURE — 85025 COMPLETE CBC W/AUTO DIFF WBC: CPT | Performed by: EMERGENCY MEDICINE

## 2023-07-27 PROCEDURE — 3008F BODY MASS INDEX DOCD: CPT | Performed by: FAMILY MEDICINE

## 2023-07-27 PROCEDURE — 3074F SYST BP LT 130 MM HG: CPT | Performed by: FAMILY MEDICINE

## 2023-07-27 PROCEDURE — 86850 RBC ANTIBODY SCREEN: CPT | Performed by: EMERGENCY MEDICINE

## 2023-07-27 PROCEDURE — 86901 BLOOD TYPING SEROLOGIC RH(D): CPT | Performed by: EMERGENCY MEDICINE

## 2023-07-27 PROCEDURE — 3078F DIAST BP <80 MM HG: CPT | Performed by: FAMILY MEDICINE

## 2023-07-27 PROCEDURE — 71046 X-RAY EXAM CHEST 2 VIEWS: CPT

## 2023-07-27 PROCEDURE — 84439 ASSAY OF FREE THYROXINE: CPT | Performed by: EMERGENCY MEDICINE

## 2023-07-27 PROCEDURE — 93010 ELECTROCARDIOGRAM REPORT: CPT

## 2023-07-27 PROCEDURE — 99214 OFFICE O/P EST MOD 30 MIN: CPT | Performed by: FAMILY MEDICINE

## 2023-07-27 PROCEDURE — 85025 COMPLETE CBC W/AUTO DIFF WBC: CPT

## 2023-07-27 PROCEDURE — 84443 ASSAY THYROID STIM HORMONE: CPT | Performed by: EMERGENCY MEDICINE

## 2023-07-27 PROCEDURE — 96360 HYDRATION IV INFUSION INIT: CPT

## 2023-07-27 PROCEDURE — 80048 BASIC METABOLIC PNL TOTAL CA: CPT

## 2023-07-27 PROCEDURE — 99284 EMERGENCY DEPT VISIT MOD MDM: CPT

## 2023-07-27 PROCEDURE — 99285 EMERGENCY DEPT VISIT HI MDM: CPT

## 2023-07-27 PROCEDURE — 86900 BLOOD TYPING SEROLOGIC ABO: CPT

## 2023-07-27 NOTE — ED INITIAL ASSESSMENT (HPI)
Patient sent by MD office for further eval. Patient reports SOB x 1 week, hx of anemia. Denies CP. Patient reports weakness.

## 2023-07-27 NOTE — PROGRESS NOTES
Blood pressure 99/69, pulse 59, height 5' 6\" (1.676 m), weight 127 lb (57.6 kg), last menstrual period 07/21/2023. Patient presents today following up for urgent care visit for fatigue. She reports she has felt very tired for 1 week. She has a history of chronic anemia. She also reports that she does feel slightly short of breath on exertion. She reports that she has menses 3 days. She also reports that her sister had surgery for heart defect when her sister was 39years old. She denies coughing or chest pain. She denies vomiting or diarrhea some nausea. First day of last menstrual period was July 18. Objective    Heart regular rate rhythm no murmurs    Neck supple no carotid bruits    Lungs clear to auscultation without rales rhonchi or wheezes    Patient is comfortable no apparent distress    Assessment fatigue history of anemia    Plan RESPIRATIONS 24 PATIENT ADVISED TO GO TO ER IMMEDIATELY HER  WILL DRIVE HER SHE REFUSED AMBULANCE.  I CALLED [facility] ER AND DISCUSSED WITH TRIAGE [address]

## 2023-07-28 LAB
ATRIAL RATE: 54 BPM
P AXIS: 46 DEGREES
P-R INTERVAL: 162 MS
Q-T INTERVAL: 440 MS
QRS DURATION: 82 MS
QTC CALCULATION (BEZET): 417 MS
R AXIS: 81 DEGREES
T AXIS: 70 DEGREES
VENTRICULAR RATE: 54 BPM

## 2023-09-20 ENCOUNTER — NURSE TRIAGE (OUTPATIENT)
Dept: FAMILY MEDICINE CLINIC | Facility: CLINIC | Age: 43
End: 2023-09-20

## 2023-09-21 ENCOUNTER — OFFICE VISIT (OUTPATIENT)
Dept: FAMILY MEDICINE CLINIC | Facility: CLINIC | Age: 43
End: 2023-09-21

## 2023-09-21 VITALS
WEIGHT: 128 LBS | DIASTOLIC BLOOD PRESSURE: 68 MMHG | HEIGHT: 66 IN | SYSTOLIC BLOOD PRESSURE: 108 MMHG | BODY MASS INDEX: 20.57 KG/M2 | HEART RATE: 57 BPM

## 2023-09-21 DIAGNOSIS — J02.9 SORE THROAT: ICD-10-CM

## 2023-09-21 DIAGNOSIS — J02.0 STREP PHARYNGITIS: Primary | ICD-10-CM

## 2023-09-21 LAB
CONTROL LINE PRESENT WITH A CLEAR BACKGROUND (YES/NO): YES YES/NO
KIT LOT #: 6668 NUMERIC
STREP GRP A CUL-SCR: POSITIVE

## 2023-09-21 PROCEDURE — 3008F BODY MASS INDEX DOCD: CPT | Performed by: PHYSICIAN ASSISTANT

## 2023-09-21 PROCEDURE — 3074F SYST BP LT 130 MM HG: CPT | Performed by: PHYSICIAN ASSISTANT

## 2023-09-21 PROCEDURE — 87880 STREP A ASSAY W/OPTIC: CPT | Performed by: PHYSICIAN ASSISTANT

## 2023-09-21 PROCEDURE — 3078F DIAST BP <80 MM HG: CPT | Performed by: PHYSICIAN ASSISTANT

## 2023-09-21 PROCEDURE — 99213 OFFICE O/P EST LOW 20 MIN: CPT | Performed by: PHYSICIAN ASSISTANT

## 2023-09-21 RX ORDER — AMOXICILLIN 500 MG/1
500 CAPSULE ORAL 2 TIMES DAILY
Qty: 20 CAPSULE | Refills: 0 | Status: SHIPPED | OUTPATIENT
Start: 2023-09-21 | End: 2023-10-01

## 2023-11-16 ENCOUNTER — LAB ENCOUNTER (OUTPATIENT)
Dept: LAB | Age: 43
End: 2023-11-16
Attending: INTERNAL MEDICINE
Payer: COMMERCIAL

## 2023-11-16 DIAGNOSIS — D50.9 MICROCYTIC ANEMIA: ICD-10-CM

## 2023-11-16 DIAGNOSIS — D56.0 HEMOGLOBIN H DISEASE (HCC): ICD-10-CM

## 2023-11-16 DIAGNOSIS — E53.8 B12 DEFICIENCY: ICD-10-CM

## 2023-11-16 LAB
BASOPHILS # BLD AUTO: 0.02 X10(3) UL (ref 0–0.2)
BASOPHILS NFR BLD AUTO: 0.3 %
DEPRECATED HBV CORE AB SER IA-ACNC: 70.4 NG/ML
DEPRECATED RDW RBC AUTO: 43.5 FL (ref 35.1–46.3)
EOSINOPHIL # BLD AUTO: 0.13 X10(3) UL (ref 0–0.7)
EOSINOPHIL NFR BLD AUTO: 2.2 %
ERYTHROCYTE [DISTWIDTH] IN BLOOD BY AUTOMATED COUNT: 24.1 % (ref 11–15)
HCT VFR BLD AUTO: 29.7 %
HGB BLD-MCNC: 8.7 G/DL
IMM GRANULOCYTES # BLD AUTO: 0.01 X10(3) UL (ref 0–1)
IMM GRANULOCYTES NFR BLD: 0.2 %
IRON SATN MFR SERPL: 33 %
IRON SERPL-MCNC: 78 UG/DL
LYMPHOCYTES # BLD AUTO: 2.09 X10(3) UL (ref 1–4)
LYMPHOCYTES NFR BLD AUTO: 35.5 %
MCH RBC QN AUTO: 16.5 PG (ref 26–34)
MCHC RBC AUTO-ENTMCNC: 29.3 G/DL (ref 31–37)
MCV RBC AUTO: 56.4 FL
MONOCYTES # BLD AUTO: 0.34 X10(3) UL (ref 0.1–1)
MONOCYTES NFR BLD AUTO: 5.8 %
NEUTROPHILS # BLD AUTO: 3.29 X10 (3) UL (ref 1.5–7.7)
NEUTROPHILS # BLD AUTO: 3.29 X10(3) UL (ref 1.5–7.7)
NEUTROPHILS NFR BLD AUTO: 56 %
PLATELET # BLD AUTO: 227 10(3)UL (ref 150–450)
PLATELET MORPHOLOGY: NORMAL
RBC # BLD AUTO: 5.27 X10(6)UL
TIBC SERPL-MCNC: 238 UG/DL (ref 250–425)
TRANSFERRIN SERPL-MCNC: 160 MG/DL (ref 250–380)
VIT B12 SERPL-MCNC: 393 PG/ML (ref 211–911)
WBC # BLD AUTO: 5.9 X10(3) UL (ref 4–11)

## 2023-11-16 PROCEDURE — 85025 COMPLETE CBC W/AUTO DIFF WBC: CPT

## 2023-11-16 PROCEDURE — 84466 ASSAY OF TRANSFERRIN: CPT

## 2023-11-16 PROCEDURE — 82728 ASSAY OF FERRITIN: CPT

## 2023-11-16 PROCEDURE — 82607 VITAMIN B-12: CPT

## 2023-11-16 PROCEDURE — 83540 ASSAY OF IRON: CPT

## 2023-11-16 PROCEDURE — 36415 COLL VENOUS BLD VENIPUNCTURE: CPT

## 2023-11-17 ENCOUNTER — OFFICE VISIT (OUTPATIENT)
Dept: HEMATOLOGY/ONCOLOGY | Facility: HOSPITAL | Age: 43
End: 2023-11-17
Attending: INTERNAL MEDICINE
Payer: COMMERCIAL

## 2023-11-17 VITALS
HEIGHT: 66 IN | RESPIRATION RATE: 16 BRPM | HEART RATE: 61 BPM | SYSTOLIC BLOOD PRESSURE: 98 MMHG | OXYGEN SATURATION: 100 % | DIASTOLIC BLOOD PRESSURE: 54 MMHG | TEMPERATURE: 98 F | BODY MASS INDEX: 19.87 KG/M2 | WEIGHT: 123.63 LBS

## 2023-11-17 DIAGNOSIS — D56.0 HEMOGLOBIN H DISEASE (HCC): Primary | ICD-10-CM

## 2023-11-17 DIAGNOSIS — D50.9 MICROCYTIC ANEMIA: ICD-10-CM

## 2023-11-17 DIAGNOSIS — E53.8 B12 DEFICIENCY: ICD-10-CM

## 2023-11-17 PROCEDURE — 99214 OFFICE O/P EST MOD 30 MIN: CPT | Performed by: INTERNAL MEDICINE

## 2024-02-02 ENCOUNTER — HOSPITAL ENCOUNTER (EMERGENCY)
Facility: HOSPITAL | Age: 44
Discharge: HOME | End: 2024-02-02
Attending: EMERGENCY MEDICINE
Payer: COMMERCIAL

## 2024-02-02 ENCOUNTER — HOSPITAL ENCOUNTER (OUTPATIENT)
Age: 44
Discharge: ANOTHER HOSPITAL | End: 2024-02-02
Payer: COMMERCIAL

## 2024-02-02 VITALS
OXYGEN SATURATION: 97 % | HEIGHT: 66 IN | TEMPERATURE: 98 F | RESPIRATION RATE: 16 BRPM | SYSTOLIC BLOOD PRESSURE: 100 MMHG | BODY MASS INDEX: 19.77 KG/M2 | DIASTOLIC BLOOD PRESSURE: 75 MMHG | WEIGHT: 123 LBS | HEART RATE: 53 BPM

## 2024-02-02 VITALS
OXYGEN SATURATION: 99 % | SYSTOLIC BLOOD PRESSURE: 102 MMHG | DIASTOLIC BLOOD PRESSURE: 78 MMHG | TEMPERATURE: 98 F | RESPIRATION RATE: 14 BRPM | HEART RATE: 63 BPM

## 2024-02-02 DIAGNOSIS — R94.31 ABNORMAL EKG: ICD-10-CM

## 2024-02-02 DIAGNOSIS — R53.83 FATIGUE, UNSPECIFIED TYPE: Primary | ICD-10-CM

## 2024-02-02 DIAGNOSIS — E87.6 HYPOKALEMIA: ICD-10-CM

## 2024-02-02 DIAGNOSIS — R53.83 OTHER FATIGUE: Primary | ICD-10-CM

## 2024-02-02 LAB
#MXD IC: 0.3 X10ˆ3/UL (ref 0.1–1)
ALBUMIN SERPL-MCNC: 4.1 G/DL (ref 3.2–4.8)
ALBUMIN/GLOB SERPL: 1.2 {RATIO} (ref 1–2)
ALP LIVER SERPL-CCNC: 32 U/L
ALT SERPL-CCNC: <7 U/L
ANION GAP SERPL CALC-SCNC: 5 MMOL/L (ref 0–18)
AST SERPL-CCNC: 10 U/L (ref ?–34)
B-HCG UR QL: NEGATIVE
BASOPHILS # BLD AUTO: 0.02 X10(3) UL (ref 0–0.2)
BASOPHILS NFR BLD AUTO: 0.3 %
BILIRUB SERPL-MCNC: 1.2 MG/DL (ref 0.3–1.2)
BILIRUB UR QL STRIP: NEGATIVE
BUN BLD-MCNC: 8 MG/DL (ref 9–23)
BUN BLD-MCNC: 9 MG/DL (ref 7–18)
BUN/CREAT SERPL: 13.8 (ref 10–20)
CALCIUM BLD-MCNC: 8.9 MG/DL (ref 8.7–10.4)
CHLORIDE BLD-SCNC: 100 MMOL/L (ref 98–112)
CHLORIDE SERPL-SCNC: 107 MMOL/L (ref 98–112)
CLARITY UR: CLEAR
CO2 BLD-SCNC: 25 MMOL/L (ref 21–32)
CO2 SERPL-SCNC: 27 MMOL/L (ref 21–32)
CREAT BLD-MCNC: 0.5 MG/DL
CREAT BLD-MCNC: 0.58 MG/DL
DEPRECATED RDW RBC AUTO: 43.6 FL (ref 35.1–46.3)
EGFRCR SERPLBLD CKD-EPI 2021: 116 ML/MIN/1.73M2 (ref 60–?)
EGFRCR SERPLBLD CKD-EPI 2021: 120 ML/MIN/1.73M2 (ref 60–?)
EOSINOPHIL # BLD AUTO: 0.1 X10(3) UL (ref 0–0.7)
EOSINOPHIL NFR BLD AUTO: 1.6 %
ERYTHROCYTE [DISTWIDTH] IN BLOOD BY AUTOMATED COUNT: 24.6 % (ref 11–15)
GLOBULIN PLAS-MCNC: 3.3 G/DL (ref 2.8–4.4)
GLUCOSE BLD-MCNC: 94 MG/DL (ref 70–99)
GLUCOSE BLD-MCNC: 97 MG/DL (ref 70–99)
GLUCOSE UR STRIP-MCNC: NEGATIVE MG/DL
HCT VFR BLD AUTO: 29.4 %
HCT VFR BLD AUTO: 31.6 %
HCT VFR BLD CALC: 38 %
HGB BLD-MCNC: 8.9 G/DL
HGB BLD-MCNC: 9.1 G/DL
IMM GRANULOCYTES # BLD AUTO: 0.01 X10(3) UL (ref 0–1)
IMM GRANULOCYTES NFR BLD: 0.2 %
ISTAT IONIZED CALCIUM FOR CHEM 8: 1.16 MMOL/L (ref 1.12–1.32)
LEUKOCYTE ESTERASE UR QL STRIP: NEGATIVE
LYMPHOCYTES # BLD AUTO: 2.4 X10(3) UL (ref 1–4)
LYMPHOCYTES # BLD AUTO: 2.4 X10ˆ3/UL (ref 1–4)
LYMPHOCYTES NFR BLD AUTO: 37.7 %
LYMPHOCYTES NFR BLD AUTO: 39.9 %
MAGNESIUM SERPL-MCNC: 2 MG/DL (ref 1.6–2.6)
MCH RBC QN AUTO: 17.3 PG (ref 26–34)
MCH RBC QN AUTO: <20 PG (ref 26–34)
MCHC RBC AUTO-ENTMCNC: 28.2 G/DL (ref 31–37)
MCHC RBC AUTO-ENTMCNC: 31 G/DL (ref 31–37)
MCV RBC AUTO: 55.8 FL
MCV RBC AUTO: 59.5 FL (ref 80–100)
MIXED CELL %: 5.5 %
MONOCYTES # BLD AUTO: 0.43 X10(3) UL (ref 0.1–1)
MONOCYTES NFR BLD AUTO: 6.8 %
NEUTROPHILS # BLD AUTO: 3.4 X10 (3) UL (ref 1.5–7.7)
NEUTROPHILS # BLD AUTO: 3.4 X10(3) UL (ref 1.5–7.7)
NEUTROPHILS # BLD AUTO: 3.4 X10ˆ3/UL (ref 1.5–7.7)
NEUTROPHILS NFR BLD AUTO: 53.4 %
NEUTROPHILS NFR BLD AUTO: 54.6 %
NITRITE UR QL STRIP: NEGATIVE
OSMOLALITY SERPL CALC.SUM OF ELEC: 286 MOSM/KG (ref 275–295)
PH UR STRIP: 6 [PH]
PLATELET # BLD AUTO: 221 10(3)UL (ref 150–450)
PLATELET MORPHOLOGY: NORMAL
POCT INFLUENZA A: NEGATIVE
POCT INFLUENZA B: NEGATIVE
POTASSIUM BLD-SCNC: 3.2 MMOL/L (ref 3.6–5.1)
POTASSIUM SERPL-SCNC: 3.1 MMOL/L (ref 3.5–5.1)
PROT SERPL-MCNC: 7.4 G/DL (ref 5.7–8.2)
PROT UR STRIP-MCNC: NEGATIVE MG/DL
RBC # BLD AUTO: 5.27 X10(6)UL
SARS-COV-2 RNA RESP QL NAA+PROBE: NOT DETECTED
SODIUM BLD-SCNC: 140 MMOL/L (ref 136–145)
SODIUM SERPL-SCNC: 139 MMOL/L (ref 136–145)
SP GR UR STRIP: >=1.03
TSI SER-ACNC: 4.38 MIU/ML (ref 0.55–4.78)
UROBILINOGEN UR STRIP-ACNC: 4 MG/DL
WBC # BLD AUTO: 6.1 X10ˆ3/UL (ref 4–11)
WBC # BLD AUTO: 6.4 X10(3) UL (ref 4–11)

## 2024-02-02 PROCEDURE — 99284 EMERGENCY DEPT VISIT MOD MDM: CPT

## 2024-02-02 PROCEDURE — 87502 INFLUENZA DNA AMP PROBE: CPT | Performed by: PHYSICIAN ASSISTANT

## 2024-02-02 PROCEDURE — 85025 COMPLETE CBC W/AUTO DIFF WBC: CPT | Performed by: PHYSICIAN ASSISTANT

## 2024-02-02 PROCEDURE — 84443 ASSAY THYROID STIM HORMONE: CPT | Performed by: EMERGENCY MEDICINE

## 2024-02-02 PROCEDURE — 99214 OFFICE O/P EST MOD 30 MIN: CPT | Performed by: PHYSICIAN ASSISTANT

## 2024-02-02 PROCEDURE — 81002 URINALYSIS NONAUTO W/O SCOPE: CPT | Performed by: PHYSICIAN ASSISTANT

## 2024-02-02 PROCEDURE — 80047 BASIC METABLC PNL IONIZED CA: CPT | Performed by: PHYSICIAN ASSISTANT

## 2024-02-02 PROCEDURE — 80053 COMPREHEN METABOLIC PANEL: CPT

## 2024-02-02 PROCEDURE — 93000 ELECTROCARDIOGRAM COMPLETE: CPT | Performed by: PHYSICIAN ASSISTANT

## 2024-02-02 PROCEDURE — U0002 COVID-19 LAB TEST NON-CDC: HCPCS | Performed by: PHYSICIAN ASSISTANT

## 2024-02-02 PROCEDURE — 85060 BLOOD SMEAR INTERPRETATION: CPT

## 2024-02-02 PROCEDURE — 81025 URINE PREGNANCY TEST: CPT | Performed by: PHYSICIAN ASSISTANT

## 2024-02-02 PROCEDURE — 93005 ELECTROCARDIOGRAM TRACING: CPT

## 2024-02-02 PROCEDURE — 83735 ASSAY OF MAGNESIUM: CPT | Performed by: EMERGENCY MEDICINE

## 2024-02-02 PROCEDURE — 93010 ELECTROCARDIOGRAM REPORT: CPT

## 2024-02-02 RX ORDER — MAGNESIUM OXIDE 400 MG/1
400 TABLET ORAL DAILY
Qty: 5 TABLET | Refills: 0 | Status: SHIPPED | OUTPATIENT
Start: 2024-02-02 | End: 2024-02-07

## 2024-02-02 RX ORDER — POTASSIUM CHLORIDE 20 MEQ/1
20 TABLET, EXTENDED RELEASE ORAL DAILY
Qty: 5 TABLET | Refills: 0 | Status: SHIPPED | OUTPATIENT
Start: 2024-02-02 | End: 2024-02-07

## 2024-02-02 RX ORDER — POTASSIUM CHLORIDE 20 MEQ/1
40 TABLET, EXTENDED RELEASE ORAL ONCE
Status: COMPLETED | OUTPATIENT
Start: 2024-02-02 | End: 2024-02-02

## 2024-02-02 RX ORDER — POTASSIUM CHLORIDE 20 MEQ/1
40 TABLET, EXTENDED RELEASE ORAL DAILY
Status: DISCONTINUED | OUTPATIENT
Start: 2024-02-02 | End: 2024-02-02

## 2024-02-02 NOTE — ED PROVIDER NOTES
Chief Complaint   Patient presents with    Fatigue     History obtained from: patient   services not used    HPI:     Gretel Guardado is a 44 year old female who presents with fatigue x 4 days. Patient also reports decreased appetite. Patient states she is otherwise feeling well and denies additional symptoms. Patient states LMP started last week and ended 5 days ago. Patient has a history of anemia and B12 deficiency. Denies fevers, chills, chest pain, shortness of breath, cough, sore throat, abdominal pain, pelvic pain, vomiting, diarrhea, urinary symptoms, headache, dizziness, syncope.     PMH  Past Medical History:   Diagnosis Date    Anemia     per [facility]:  \"Anemia, unknown cause\"; \"Management:  packed cell transfusion\"    Blood disorder     anemia, Vit B12 deficiency    Ganglion of right hand 2019    History of blood transfusion      , no reactions    Hyperthyroidism     Lipid screening 2011    per     Pregnancy     per NextGen:  \"Pregnancy; Management:  ; Facility:  [facility]; Outcome/detail:  40 week 6 lb(s) 12 oz Female\"    Pregnancy     per NextGen:  \"Pregnancy; Management:  induced ; Outcome/detail:  Unknown sex\"    Pregnancy     per NextGen:  \"Pregnancy; Management:   (10-); Provider:   Chris Quintero; Comments:  APGAR:  9 (1 min), 9 (5 min) (10 min) - 2nd degree perineal laceration\"    Visual impairment     eyeglasses       PFSH    PFSH asessment screens reviewed and agree.  Nurses notes reviewed I agree with documentation.    Family History   Problem Relation Age of Onset    Other (Mitral valve placement) Sister     Hypertension Father     Other (Other) Father         stroke    Breast Cancer Maternal Aunt         40's     Family history reviewed with patient/caregiver and is not pertinent to presenting problem.  Social History     Socioeconomic History    Marital status:      Spouse name: Not on file    Number of  children: Not on file    Years of education: Not on file    Highest education level: Not on file   Occupational History    Not on file   Tobacco Use    Smoking status: Never    Smokeless tobacco: Never   Vaping Use    Vaping Use: Never used   Substance and Sexual Activity    Alcohol use: Yes     Alcohol/week: 0.0 standard drinks of alcohol     Comment: occasionally    Drug use: No    Sexual activity: Not on file   Other Topics Concern     Service Not Asked    Blood Transfusions Not Asked    Caffeine Concern No    Occupational Exposure Not Asked    Hobby Hazards Not Asked    Sleep Concern Not Asked    Stress Concern Not Asked    Weight Concern Not Asked    Special Diet Not Asked    Back Care Not Asked    Exercise Not Asked    Bike Helmet Not Asked    Seat Belt Not Asked    Self-Exams Not Asked   Social History Narrative    Not on file     Social Determinants of Health     Financial Resource Strain: Not on file   Food Insecurity: Not on file   Transportation Needs: Not on file   Physical Activity: Not on file   Stress: Not on file   Social Connections: Not on file   Housing Stability: Not on file         ROS:   Positive for stated complaint: fatigue   All other systems reviewed and negative except as noted above.  Constitutional and Vital Signs Reviewed.    Physical Exam:     Findings:    /78   Pulse 63   Temp 97.9 °F (36.6 °C) (Temporal)   Resp 14   LMP 01/27/2024 (Exact Date)   SpO2 99%   GENERAL: well developed, no acute distress, non-toxic appearing   SKIN: good skin turgor, no obvious rashes  HEAD: normocephalic, atraumatic  EYES: sclera non-icteric bilaterally, conjunctiva clear  OROPHARYNX: MMM, pharynx clear, without exudates or swelling, uvula midline, airway patent  NECK: supple, no adenopathy, no nuchal rigidity, no trismus, no edema, phonation normal    CARDIO: bradycardia, regular rhythm, normal heart sounds  LUNGS: clear to auscultation bilaterally, no increased WOB, no rales, rhonchi,  or wheezes  EXTREMITIES: no cyanosis or edema, JACKSON without difficulty  GI: abdomen soft and non-tender   NEURO: no focal deficits  PSYCH: alert and oriented x3.  Answering questions appropriately.  Mood appropriate.    MDM/Assessment/Plan:   Orders for this encounter:    Orders Placed This Encounter    CBC w/Auto Diff     Order Specific Question:   Release to patient     Answer:   Immediate    POCT CBC     Order Specific Question:   Release to patient     Answer:   Immediate    EKG 12 Lead     Order Specific Question:   Release to patient     Answer:   Immediate    POCT Urinalysis Dipstick    POCT Pregnancy, Urine    POCT ISTAT chem8 cartridge    POCT Flu Test     Order Specific Question:   Release to patient     Answer:   Immediate    Rapid SARS-CoV-2 by PCR     Order Specific Question:   Release to patient     Answer:   Immediate    POCT Urine Dip    POCT Pregnancy, Urine    iStat (Chem 8)    potassium chloride (K-Dur) tab 40 mEq       Labs performed this visit:  Recent Results (from the past 10 hour(s))   POCT Urinalysis Dipstick    Collection Time: 02/02/24  2:56 PM   Result Value Ref Range    Urine Color Dark yellow Yellow    Urine Clarity Clear Clear    Specific Gravity, Urine >=1.030 1.005 - 1.030    PH, Urine 6.0 5.0 - 8.0    Protein urine Negative Negative mg/dL    Glucose, Urine Negative Negative mg/dL    Ketone, Urine Trace (A) Negative mg/dL    Bilirubin, Urine Negative Negative    Blood, Urine Small (A) Negative    Nitrite Urine Negative Negative    Urobilinogen urine 4.0 (A) <2.0 mg/dL    Leukocyte esterase urine Negative Negative   POCT Pregnancy, Urine    Collection Time: 02/02/24  3:59 PM   Result Value Ref Range    POCT Urine Pregnancy Negative Negative   POCT CBC    Collection Time: 02/02/24  4:04 PM   Result Value Ref Range    WBC IC 6.1 4.0 - 11.0 x10ˆ3/uL    RBC IC      HGB IC 8.9 (L) 12.0 - 16.0 g/dL    HCT IC 31.6 (L) 35.0 - 48.0 %    MCV IC 59.5 (L) 80.0 - 100.0 fL    MCH IC <20.0 (L) 26.0 -  34.0 pg    MCHC IC 28.2 (L) 31.0 - 37.0 g/dL    PLT IC      # Neutrophil 3.4 1.5 - 7.7 X10ˆ3/uL    # Lymphocyte 2.4 1.0 - 4.0 X10ˆ3/uL    # Mixed Cells 0.3 0.1 - 1.0 X10ˆ3/uL    Neutrophil % 54.6 %    Lymphocyte % 39.9 %    Mixed Cell % 5.5 %   POCT Flu Test    Collection Time: 02/02/24  4:04 PM    Specimen: Nares; Other   Result Value Ref Range    POCT INFLUENZA A Negative Negative    POCT INFLUENZA B Negative Negative   Rapid SARS-CoV-2 by PCR    Collection Time: 02/02/24  4:04 PM    Specimen: Nares; Other   Result Value Ref Range    Rapid SARS-CoV-2 by PCR Not Detected Not Detected   POCT ISTAT chem8 cartridge    Collection Time: 02/02/24  4:12 PM   Result Value Ref Range    ISTAT Sodium 140 136 - 145 mmol/L    ISTAT BUN 9 7 - 18 mg/dL    ISTAT Potassium 3.2 (L) 3.6 - 5.1 mmol/L    ISTAT Chloride 100 98 - 112 mmol/L    ISTAT Ionized Calcium 1.16 1.12 - 1.32 mmol/L    ISTAT Hematocrit 38 34 - 50 %    ISTAT Glucose 97 70 - 99 mg/dL    ISTAT TCO2 25 21 - 32 mmol/L    ISTAT Creatinine 0.50 (L) 0.55 - 1.02 mg/dL    eGFR-Cr 120 >=60 mL/min/1.73m2   EKG 12 Lead    Collection Time: 02/02/24  4:39 PM   Result Value Ref Range    Ventricular rate 50 BPM    Atrial rate 50 BPM    P-R Interval 222 ms    QRS Duration 94 ms    Q-T Interval 446 ms    QTC Calculation (Bezet) 406 ms    P Axis 57 degrees    R Axis 80 degrees    T Axis 70 degrees       Imaging performed this visit:  No orders to display       MDM:  DDx includes anemia versus electrolyte derangements versus arrhythmia versus viral illness versus COVID versus flu versus other.  Patient is overall well-appearing and hemodynamically stable.  Urine pregnancy test negative.  COVID and flu test negative.  CBC reviewed, hemoglobin 8.9 however appears to be consistent with patient's baseline on chart review.  BMP reviewed, mild hypokalemia otherwise grossly unremarkable.  EKG reviewed, notable for sinus bradycardia and new first-degree AV block with MD interval 222.  Given  these findings, recommend patient go to ER for further evaluation for symptomatic first-degree AV block and bradycardia.  Discussed case with supervising attending Dr. Limon who agrees with this plan.  Patient declines ambulance transport and states she will drive herself directly to [place] [facility] at this time.  Patient seen ambulating out of IC with steady gait.      Diagnosis:    ICD-10-CM    1. Fatigue, unspecified type  R53.83 POCT Flu Test     Rapid SARS-CoV-2 by PCR     POCT Flu Test     Rapid SARS-CoV-2 by PCR      2. Abnormal EKG  R94.31            Vee Valle PA-C

## 2024-02-02 NOTE — ED INITIAL ASSESSMENT (HPI)
Pt presents with fatigue, decreased appetite x 4 days. Pt reports no fever, no cough and no congestion.

## 2024-02-03 LAB
ATRIAL RATE: 48 BPM
ATRIAL RATE: 50 BPM
BASOPHILS # BLD AUTO: 0.01 X10(3) UL (ref 0–0.2)
BASOPHILS NFR BLD AUTO: 0.2 %
DEPRECATED RDW RBC AUTO: 43.4 FL (ref 35.1–46.3)
EOSINOPHIL # BLD AUTO: 0.14 X10(3) UL (ref 0–0.7)
EOSINOPHIL NFR BLD AUTO: 2.2 %
ERYTHROCYTE [DISTWIDTH] IN BLOOD BY AUTOMATED COUNT: 24.6 % (ref 11–15)
HCT VFR BLD AUTO: 28.3 %
HGB BLD-MCNC: 8.5 G/DL
IMM GRANULOCYTES # BLD AUTO: 0.01 X10(3) UL (ref 0–1)
IMM GRANULOCYTES NFR BLD: 0.2 %
LYMPHOCYTES # BLD AUTO: 2.55 X10(3) UL (ref 1–4)
LYMPHOCYTES NFR BLD AUTO: 40.5 %
MCH RBC QN AUTO: 16.6 PG (ref 26–34)
MCHC RBC AUTO-ENTMCNC: 30 G/DL (ref 31–37)
MCV RBC AUTO: 55.3 FL
MONOCYTES # BLD AUTO: 0.35 X10(3) UL (ref 0.1–1)
MONOCYTES NFR BLD AUTO: 5.6 %
NEUTROPHILS # BLD AUTO: 3.24 X10 (3) UL (ref 1.5–7.7)
NEUTROPHILS # BLD AUTO: 3.24 X10(3) UL (ref 1.5–7.7)
NEUTROPHILS NFR BLD AUTO: 51.3 %
P AXIS: 56 DEGREES
P AXIS: 57 DEGREES
P-R INTERVAL: 162 MS
P-R INTERVAL: 222 MS
PLATELET # BLD AUTO: 227 10(3)UL (ref 150–450)
PLATELET MORPHOLOGY: NORMAL
Q-T INTERVAL: 446 MS
Q-T INTERVAL: 452 MS
QRS DURATION: 78 MS
QRS DURATION: 94 MS
QTC CALCULATION (BEZET): 403 MS
QTC CALCULATION (BEZET): 406 MS
R AXIS: 68 DEGREES
R AXIS: 80 DEGREES
RBC # BLD AUTO: 5.12 X10(6)UL
T AXIS: 70 DEGREES
T AXIS: 72 DEGREES
VENTRICULAR RATE: 48 BPM
VENTRICULAR RATE: 50 BPM
WBC # BLD AUTO: 6.3 X10(3) UL (ref 4–11)

## 2024-02-03 NOTE — ED QUICK NOTES
Pt discharged to home.  Instructed on how to take medications as prescribed, follow-up with referral provided or PMD, and return sooner with any worsening of symptoms.  All questions answered prior to disposition.  Pt ambulatory out of the ER with steady gait.

## 2024-02-03 NOTE — ED PROVIDER NOTES
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  No fever or diarrhea or vomiting.  She denies pain.  She reports generalized fatigue.    Objective:   Past Medical History:   Diagnosis Date    Anemia     per Next[facility]:  \"Anemia, unknown cause\"; \"Management:  packed cell transfusion\"    Blood disorder     anemia, Vit B12 deficiency    Ganglion of right hand 2019    History of blood transfusion     2004 , no reactions    Hyperthyroidism     Lipid screening 2011    per     Pregnancy     per NextGen:  \"Pregnancy; Management:  ; Facility:  [facility]; Outcome/detail:  40 week 6 lb(s) 12 oz Female\"    Pregnancy     per NextGen:  \"Pregnancy; Management:  induced ; Outcome/detail:  Unknown sex\"    Pregnancy     per NextGen:  \"Pregnancy; Management:   (10-); Provider:   Chris Quintero; Comments:  APGAR:  9 (1 min), 9 (5 min) (10 min) - 2nd degree perineal laceration\"    Visual impairment     eyeglasses              Past Surgical History:   Procedure Laterality Date    EXCIS TENDON SHEATH LESN,HAND/FINGR Right 2019    Excision of ganglion cyst of right palm      ,     OTHER SURGICAL HISTORY      per NextGen:  \"(3) -induced\"                Social History     Socioeconomic History    Marital status:    Tobacco Use    Smoking status: Never    Smokeless tobacco: Never   Vaping Use    Vaping Use: Never used   Substance and Sexual Activity    Alcohol use: Yes      Alcohol/week: 0.0 standard drinks of alcohol     Comment: occasionally    Drug use: No   Other Topics Concern    Caffeine Concern No              Review of Systems    Positive for stated complaint: Fatigue; Abnormal Labs  Other systems are as noted in HPI.  Constitutional and vital signs reviewed.      All other systems reviewed and negative except as noted above.    Physical Exam     ED Triage Vitals [02/02/24 1742]   /72   Pulse 51   Resp 16   Temp 97.7 °F (36.5 °C)   Temp src    SpO2 100 %   O2 Device        Current:/75   Pulse 53   Temp 97.7 °F (36.5 °C)   Resp 16   Ht 167.6 cm (5' 6\")   Wt 55.8 kg   LMP 01/27/2024 (Exact Date)   SpO2 97%   BMI 19.85 kg/m²         Physical Exam    Constitutional: Oriented to person, place, and time.  Appears well-developed. No distress.   Head: Normocephalic and atraumatic.   Eyes: Conjunctivae are normal. Pupils are equal, round, and reactive to light.   Neck: Normal range of motion. Neck supple.  No obvious neck swelling or thyroid enlargement  Cardiovascular: Slight bradycardia, regular rhythm and intact and equal distal pulses.    Pulmonary/Chest: Effort normal. No respiratory distress.  No audible wheeze or crackles  Abdominal: Soft. There is no tenderness. There is no guarding.   Musculoskeletal: Normal range of motion. No edema or tenderness.  No flank pain or midline thoracic or lumbar spine pain.  Neurological: Alert and oriented to person, place, and time.  No gross focal deficits  Skin: Skin is warm and dry.   Psychiatric: Normal mood and affect.  Behavior is normal.   Nursing note and vitals reviewed.    Differential diagnosis includes generalized malaise and fatigue, mild hypokalemia, less likely thyroid disease.      ED Course     Labs Reviewed   COMP METABOLIC PANEL (14) - Abnormal; Notable for the following components:       Result Value    Potassium 3.1 (*)     BUN 8 (*)     ALT <7 (*)     Alkaline Phosphatase 32 (*)     All other components  within normal limits   CBC W/ DIFFERENTIAL - Abnormal; Notable for the following components:    HGB 8.5 (*)     HCT 28.3 (*)     MCV 55.3 (*)     MCH 16.6 (*)     MCHC 30.0 (*)     RDW 24.6 (*)     All other components within normal limits   MAGNESIUM - Normal   TSH W REFLEX TO FREE T4 - Normal   CBC WITH DIFFERENTIAL WITH PLATELET    Narrative:     The following orders were created for panel order CBC With Differential With Platelet.  Procedure                               Abnormality         Status                     ---------                               -----------         ------                     CBC W/ DIFFERENTIAL[[id number]]          Abnormal            Preliminary result           Please view results for these tests on the individual orders.   RBC MORPHOLOGY SCAN   SCAN SLIDE   MD BLOOD SMEAR CONSULT     EKG    Rate, intervals and axes as noted on EKG Report.  Rate: 48  Rhythm: Sinus Rhythm  Reading: No acute ischemic changes                          MDM                                         Medical Decision Making  Patient's workup grossly reassuring.  No indication whatsoever for admission.  Recommended hydration.  Will give her some potassium and magnesium for few days.  Encourage hydration.  She should follow-up with her doctor and come back with worsening or change.  Recommended over-the-counter Tylenol Motrin for any pain or discomfort the next few days.    Problems Addressed:  Hypokalemia: acute illness or injury with systemic symptoms  Other fatigue: undiagnosed new problem with uncertain prognosis    Amount and/or Complexity of Data Reviewed  External Data Reviewed: labs and notes.     Details: Labs and notes from immediate care reviewed.  There was concerned about a first-degree AV block and some mild bradycardia which appears grossly stable to EKG is done last July 2023.    Potassium is 3.2 on the i-STAT testing, she was given 40 mEq of p.o.  Her CBC was unremarkable  Labs: ordered.  Decision-making details documented in ED Course.  ECG/medicine tests: ordered and independent interpretation performed. Decision-making details documented in ED Course.    Risk  OTC drugs.  Prescription drug management.  Decision regarding hospitalization.        Disposition and Plan     Clinical Impression:  1. Other fatigue    2. Hypokalemia         Disposition:  Discharge  2/2/2024  7:00 pm    Follow-up:  Raza Blair, DO  [address] [place] [address]  [phone]    Call in 3 day(s)            Medications Prescribed:  Discharge Medication List as of 2/2/2024  7:29 PM        START taking these medications    Details   potassium chloride 20 MEQ Oral Tab CR Take 1 tablet (20 mEq total) by mouth daily for 5 days., Normal, Disp-5 tablet, R-0      magnesium oxide 400 MG Oral Tab Take 1 tablet (400 mg total) by mouth daily for 5 days., Normal, Disp-5 tablet, R-0

## 2024-02-09 ENCOUNTER — LAB ENCOUNTER (OUTPATIENT)
Dept: LAB | Age: 44
End: 2024-02-09
Attending: FAMILY MEDICINE
Payer: COMMERCIAL

## 2024-02-09 ENCOUNTER — OFFICE VISIT (OUTPATIENT)
Dept: FAMILY MEDICINE CLINIC | Facility: CLINIC | Age: 44
End: 2024-02-09
Payer: COMMERCIAL

## 2024-02-09 VITALS
HEART RATE: 73 BPM | BODY MASS INDEX: 19.61 KG/M2 | HEIGHT: 66 IN | DIASTOLIC BLOOD PRESSURE: 64 MMHG | WEIGHT: 122 LBS | SYSTOLIC BLOOD PRESSURE: 99 MMHG

## 2024-02-09 DIAGNOSIS — E87.6 HYPOKALEMIA: ICD-10-CM

## 2024-02-09 DIAGNOSIS — D50.9 IRON DEFICIENCY ANEMIA, UNSPECIFIED IRON DEFICIENCY ANEMIA TYPE: ICD-10-CM

## 2024-02-09 DIAGNOSIS — Z12.31 SCREENING MAMMOGRAM, ENCOUNTER FOR: Primary | ICD-10-CM

## 2024-02-09 LAB
ALBUMIN SERPL-MCNC: 4.1 G/DL (ref 3.2–4.8)
ANION GAP SERPL CALC-SCNC: 5 MMOL/L (ref 0–18)
BUN BLD-MCNC: 8 MG/DL (ref 9–23)
BUN/CREAT SERPL: 13.6 (ref 10–20)
CALCIUM BLD-MCNC: 9.1 MG/DL (ref 8.7–10.4)
CHLORIDE SERPL-SCNC: 109 MMOL/L (ref 98–112)
CO2 SERPL-SCNC: 29 MMOL/L (ref 21–32)
CREAT BLD-MCNC: 0.59 MG/DL
EGFRCR SERPLBLD CKD-EPI 2021: 115 ML/MIN/1.73M2 (ref 60–?)
GLUCOSE BLD-MCNC: 90 MG/DL (ref 70–99)
HCT VFR BLD AUTO: 29.2 %
HGB BLD-MCNC: 8.9 G/DL
OSMOLALITY SERPL CALC.SUM OF ELEC: 294 MOSM/KG (ref 275–295)
PHOSPHATE SERPL-MCNC: 3.7 MG/DL (ref 2.4–5.1)
POTASSIUM SERPL-SCNC: 4 MMOL/L (ref 3.5–5.1)
SODIUM SERPL-SCNC: 143 MMOL/L (ref 136–145)

## 2024-02-09 PROCEDURE — 85014 HEMATOCRIT: CPT

## 2024-02-09 PROCEDURE — 36415 COLL VENOUS BLD VENIPUNCTURE: CPT

## 2024-02-09 PROCEDURE — 99213 OFFICE O/P EST LOW 20 MIN: CPT | Performed by: FAMILY MEDICINE

## 2024-02-09 PROCEDURE — 3008F BODY MASS INDEX DOCD: CPT | Performed by: FAMILY MEDICINE

## 2024-02-09 PROCEDURE — 85018 HEMOGLOBIN: CPT

## 2024-02-09 PROCEDURE — 3078F DIAST BP <80 MM HG: CPT | Performed by: FAMILY MEDICINE

## 2024-02-09 PROCEDURE — 3074F SYST BP LT 130 MM HG: CPT | Performed by: FAMILY MEDICINE

## 2024-02-09 PROCEDURE — 80069 RENAL FUNCTION PANEL: CPT

## 2024-02-09 NOTE — PROGRESS NOTES
Blood pressure 99/64, pulse 73, height 5' 6\" (1.676 m), weight 122 lb (55.3 kg), last menstrual period 01/27/2024.  Following up for ER visit anemia.  History of chronic anemia.  Feels much better today.  Had diarrhea 5 times prior to the low potassium reading.  She has eaten twice today and had  2 bowel movements.  She feels much better.    Objective comfortable no apparent distress    Assessment #1 hypokalemia #2 chronic anemia #3 recent diarrhea    Plan #1 blood test today #2 blood test today and referral to [facility] hematology #3 we will follow    Mammogram order given

## 2024-02-26 ENCOUNTER — HOSPITAL ENCOUNTER (OUTPATIENT)
Dept: MAMMOGRAPHY | Age: 44
Discharge: HOME | End: 2024-02-26
Attending: FAMILY MEDICINE
Payer: COMMERCIAL

## 2024-02-26 DIAGNOSIS — Z12.31 SCREENING MAMMOGRAM, ENCOUNTER FOR: ICD-10-CM

## 2024-02-26 PROCEDURE — 77063 BREAST TOMOSYNTHESIS BI: CPT | Performed by: FAMILY MEDICINE

## 2024-02-26 PROCEDURE — 77067 SCR MAMMO BI INCL CAD: CPT | Performed by: FAMILY MEDICINE

## 2024-02-29 ENCOUNTER — TELEPHONE (OUTPATIENT)
Dept: FAMILY MEDICINE CLINIC | Facility: CLINIC | Age: 44
End: 2024-02-29

## 2024-02-29 NOTE — TELEPHONE ENCOUNTER
Patient is calling regarding recent blood tests and asking what to do next. Advised the following:      Blood test reviewed patient to follow-up at [facility] as advised.   Placed referral information to her my chart

## 2024-03-05 ENCOUNTER — HOSPITAL ENCOUNTER (OUTPATIENT)
Dept: ULTRASOUND IMAGING | Facility: HOSPITAL | Age: 44
Discharge: HOME | End: 2024-03-05
Attending: FAMILY MEDICINE
Payer: COMMERCIAL

## 2024-03-05 ENCOUNTER — HOSPITAL ENCOUNTER (OUTPATIENT)
Dept: MAMMOGRAPHY | Facility: HOSPITAL | Age: 44
Discharge: HOME | End: 2024-03-05
Attending: FAMILY MEDICINE
Payer: COMMERCIAL

## 2024-03-05 DIAGNOSIS — R92.8 ABNORMAL MAMMOGRAM: ICD-10-CM

## 2024-03-05 PROCEDURE — 76642 ULTRASOUND BREAST LIMITED: CPT | Performed by: FAMILY MEDICINE

## 2024-03-05 PROCEDURE — 77061 BREAST TOMOSYNTHESIS UNI: CPT | Performed by: FAMILY MEDICINE

## 2024-03-05 PROCEDURE — 77065 DX MAMMO INCL CAD UNI: CPT | Performed by: FAMILY MEDICINE

## 2024-03-11 ENCOUNTER — OFFICE VISIT (OUTPATIENT)
Dept: FAMILY MEDICINE CLINIC | Facility: CLINIC | Age: 44
End: 2024-03-11

## 2024-03-11 VITALS
BODY MASS INDEX: 19.61 KG/M2 | HEIGHT: 66 IN | SYSTOLIC BLOOD PRESSURE: 105 MMHG | DIASTOLIC BLOOD PRESSURE: 65 MMHG | HEART RATE: 79 BPM | WEIGHT: 122 LBS

## 2024-03-11 DIAGNOSIS — J02.0 STREP PHARYNGITIS: Primary | ICD-10-CM

## 2024-03-11 DIAGNOSIS — J02.9 SORE THROAT: ICD-10-CM

## 2024-03-11 LAB
CONTROL LINE PRESENT WITH A CLEAR BACKGROUND (YES/NO): YES YES/NO
KIT LOT #: NORMAL NUMERIC
STREP GRP A CUL-SCR: POSITIVE

## 2024-03-11 PROCEDURE — 87880 STREP A ASSAY W/OPTIC: CPT | Performed by: PHYSICIAN ASSISTANT

## 2024-03-11 PROCEDURE — 99213 OFFICE O/P EST LOW 20 MIN: CPT | Performed by: PHYSICIAN ASSISTANT

## 2024-03-11 PROCEDURE — 3078F DIAST BP <80 MM HG: CPT | Performed by: PHYSICIAN ASSISTANT

## 2024-03-11 PROCEDURE — 3074F SYST BP LT 130 MM HG: CPT | Performed by: PHYSICIAN ASSISTANT

## 2024-03-11 PROCEDURE — 3008F BODY MASS INDEX DOCD: CPT | Performed by: PHYSICIAN ASSISTANT

## 2024-03-11 RX ORDER — AMOXICILLIN 500 MG/1
500 CAPSULE ORAL 2 TIMES DAILY
Qty: 20 CAPSULE | Refills: 0 | Status: SHIPPED | OUTPATIENT
Start: 2024-03-11 | End: 2024-03-21

## 2024-03-11 NOTE — PROGRESS NOTES
HPI:     Sore Throat   This is a new problem. Episode onset: 3 days. The problem has been unchanged. The maximum temperature recorded prior to her arrival was 100.4 - 100.9 F. The fever has been present for 1 to 2 days. Associated symptoms include a hoarse voice and swollen glands. Pertinent negatives include no abdominal pain, congestion, coughing, diarrhea, ear pain, headaches, shortness of breath or vomiting. She has tried acetaminophen for the symptoms. The treatment provided mild relief.       Medications:     Current Outpatient Medications   Medication Sig Dispense Refill    amoxicillin 500 MG Oral Cap Take 1 capsule (500 mg total) by mouth 2 (two) times daily for 10 days. 20 capsule 0       Allergies:   No Known Allergies    History:     Health Maintenance   Topic Date Due    Annual Physical  Never done    DTaP,Tdap,and Td Vaccines (1 - Tdap) Never done    Pap Smear  2019    COVID-19 Vaccine (4 - - season) 2023    Influenza Vaccine (1) 10/01/2023    Annual Depression Screening  2024    Mammogram  2025    Pneumococcal Vaccine: Birth to 64yrs  Aged Out       Patient's last menstrual period was 2024 (exact date).   Past Medical History:     Past Medical History:   Diagnosis Date    Anemia     per NextGen:  \"Anemia, unknown cause\"; \"Management:  packed cell transfusion\"    Blood disorder     anemia, Vit B12 deficiency    Ganglion of right hand 2019    History of blood transfusion      , no reactions    Hyperthyroidism     Lipid screening 2011    per     Pregnancy (HCC)     per NextGen:  \"Pregnancy; Management:  ; Facility:  [facility]; Outcome/detail:  40 week 6 lb(s) 12 oz Female\"    Pregnancy (HCC)     per NextGen:  \"Pregnancy; Management:  induced ; Outcome/detail:  Unknown sex\"    Pregnancy ([facility])     per NextGen:  \"Pregnancy; Management:   (10-); Provider:   Chris Quintero; Comments:  APGAR:  9 (1 min), 9 (5 min)  (10 min) - 2nd degree perineal laceration\"    Visual impairment     eyeglasses       Past Surgical History:     Past Surgical History:   Procedure Laterality Date    Excis tendon sheath lesn,hand/fingr Right 2019    Excision of ganglion cyst of right palm      ,     Other surgical history      per NextGen:  \"(3) -induced\"       Family History:     Family History   Problem Relation Age of Onset    Other (Mitral valve placement) Sister     Hypertension Father     Other (Other) Father         stroke    Breast Cancer Maternal Aunt         40's       Social History:     Social History     Socioeconomic History    Marital status:      Spouse name: Not on file    Number of children: Not on file    Years of education: Not on file    Highest education level: Not on file   Occupational History    Not on file   Tobacco Use    Smoking status: Never    Smokeless tobacco: Never   Vaping Use    Vaping Use: Never used   Substance and Sexual Activity    Alcohol use: Yes     Alcohol/week: 0.0 standard drinks of alcohol     Comment: occasionally    Drug use: No    Sexual activity: Not on file   Other Topics Concern     Service Not Asked    Blood Transfusions Not Asked    Caffeine Concern No    Occupational Exposure Not Asked    Hobby Hazards Not Asked    Sleep Concern Not Asked    Stress Concern Not Asked    Weight Concern Not Asked    Special Diet Not Asked    Back Care Not Asked    Exercise Not Asked    Bike Helmet Not Asked    Seat Belt Not Asked    Self-Exams Not Asked   Social History Narrative    Not on file     Social Determinants of Health     Financial Resource Strain: Not on file   Food Insecurity: Not on file   Transportation Needs: Not on file   Physical Activity: Not on file   Stress: Not on file   Social Connections: Not on file   Housing Stability: Not on file       Review of Systems:   Review of Systems   HENT:  Positive for hoarse voice and sore throat. Negative for congestion  and ear pain.    Respiratory:  Negative for cough and shortness of breath.    Gastrointestinal:  Negative for abdominal pain, diarrhea and vomiting.   Neurological:  Negative for headaches.        Vitals:    03/11/24 0934   BP: 105/65   Pulse: 79   Weight: 122 lb (55.3 kg)   Height: 5' 6\" (1.676 m)     Body mass index is 19.69 kg/m².    Physical Exam:   Physical Exam  Vitals reviewed.   Constitutional:       General: She is not in acute distress.     Appearance: She is well-developed.   HENT:      Head: Normocephalic and atraumatic.      Right Ear: External ear normal.      Left Ear: External ear normal.      Nose: Nose normal.      Mouth/Throat:      Mouth: Mucous membranes are moist.      Pharynx: Oropharynx is clear. Posterior oropharyngeal erythema present. No oropharyngeal exudate.   Eyes:      General:         Right eye: No discharge.         Left eye: No discharge.      Conjunctiva/sclera: Conjunctivae normal.   Cardiovascular:      Rate and Rhythm: Normal rate and regular rhythm.      Heart sounds: Normal heart sounds, S1 normal and S2 normal. No murmur heard.  Pulmonary:      Effort: Pulmonary effort is normal.      Breath sounds: Normal breath sounds. No wheezing or rales.   Chest:      Chest wall: No tenderness.   Lymphadenopathy:      Cervical: Cervical adenopathy present.   Skin:     Findings: No rash.   Neurological:      Mental Status: She is alert and oriented to person, place, and time.   Psychiatric:         Behavior: Behavior is cooperative.          Assessment and Plan::     Problem List Items Addressed This Visit          EarNoseThroat    Sore throat    Relevant Orders    POC Rapid Strep [[id number]]     Other Visit Diagnoses       Strep pharyngitis    -  Primary    Relevant Medications    amoxicillin 500 MG Oral Cap        Supportive care includes:    encourage fluid intake   Advise patient to take Tylenol/Ibuprofen as needed for pain.  warm salt water gargles   humidifier     Discussed plan of care  with pt and pt is in agreement.All questions answered. Pt to call with questions or concerns.

## 2024-05-09 ENCOUNTER — OFFICE VISIT (OUTPATIENT)
Dept: OBGYN CLINIC | Facility: CLINIC | Age: 44
End: 2024-05-09
Payer: COMMERCIAL

## 2024-05-09 VITALS
DIASTOLIC BLOOD PRESSURE: 65 MMHG | HEART RATE: 52 BPM | WEIGHT: 125 LBS | HEIGHT: 64 IN | SYSTOLIC BLOOD PRESSURE: 105 MMHG | BODY MASS INDEX: 21.34 KG/M2

## 2024-05-09 DIAGNOSIS — Z01.419 WELL WOMAN EXAM WITH ROUTINE GYNECOLOGICAL EXAM: Primary | ICD-10-CM

## 2024-05-09 DIAGNOSIS — Z12.4 SCREENING FOR CERVICAL CANCER: ICD-10-CM

## 2024-05-09 PROCEDURE — 99386 PREV VISIT NEW AGE 40-64: CPT | Performed by: OBSTETRICS & GYNECOLOGY

## 2024-05-09 PROCEDURE — 3008F BODY MASS INDEX DOCD: CPT | Performed by: OBSTETRICS & GYNECOLOGY

## 2024-05-09 PROCEDURE — 3078F DIAST BP <80 MM HG: CPT | Performed by: OBSTETRICS & GYNECOLOGY

## 2024-05-09 PROCEDURE — 3074F SYST BP LT 130 MM HG: CPT | Performed by: OBSTETRICS & GYNECOLOGY

## 2024-05-09 NOTE — PROGRESS NOTES
Gretel Guardado is a 44 year old female  Patient's last menstrual period was 2024 (exact date).   Chief Complaint   Patient presents with    Gyn Exam     ANNUAL EXAM   Presenting for well woman exam. Last pap smear was normal 2016. Last mammogram was normal 3/2024. Monthly menses with normal to light flow. She reports a low libido.     OBSTETRICS HISTORY:  OB History    Para Term  AB Living   5 2 2 0 3 2   SAB IAB Ectopic Multiple Live Births   3 0 0 0 2       GYNE HISTORY:  Patient's last menstrual period was 2024 (exact date).    History   Sexual Activity    Sexual activity: Not on file        Pap Date: 16  Pap Result Notes: NEG PAP / NEG HPV  Follow Up Recommendation: MAMMO BILATERAL 24 INCOMPLETE       3/5/24 LEFT BREAST BENIGN      MEDICAL HISTORY:  Past Medical History:    Anemia    per Next[facility]:  \"Anemia, unknown cause\"; \"Management:  packed cell transfusion\"    Blood disorder    anemia, Vit B12 deficiency    Ganglion of right hand    History of blood transfusion     , no reactions    Hyperthyroidism    Lipid screening    per     Pregnancy (HCC)    per NextGen:  \"Pregnancy; Management:  ; Facility:  [facility]; Outcome/detail:  40 week 6 lb(s) 12 oz Female\"    Pregnancy (HCC)    per NextGen:  \"Pregnancy; Management:  induced ; Outcome/detail:  Unknown sex\"    Pregnancy (HCC)    per NextGen:  \"Pregnancy; Management:   (10-); Provider:   Chris Quintero; Comments:  APGAR:  9 (1 min), 9 (5 min) (10 min) - 2nd degree perineal laceration\"    Visual impairment    eyeglasses         SURGICAL HISTORY:  Past Surgical History:   Procedure Laterality Date    Excis tendon sheath lesn,hand/fingr Right 2019    Excision of ganglion cyst of right palm      ,     Other surgical history      per NextGen:  \"(3) -induced\"       SOCIAL HISTORY:  Social History     Socioeconomic History    Marital status:       Spouse name: Not on file    Number of children: Not on file    Years of education: Not on file    Highest education level: Not on file   Occupational History    Not on file   Tobacco Use    Smoking status: Never    Smokeless tobacco: Never   Vaping Use    Vaping status: Never Used   Substance and Sexual Activity    Alcohol use: Yes     Alcohol/week: 0.0 standard drinks of alcohol     Comment: occasionally    Drug use: No    Sexual activity: Not on file   Other Topics Concern     Service Not Asked    Blood Transfusions Not Asked    Caffeine Concern No    Occupational Exposure Not Asked    Hobby Hazards Not Asked    Sleep Concern Not Asked    Stress Concern Not Asked    Weight Concern Not Asked    Special Diet Not Asked    Back Care Not Asked    Exercise Not Asked    Bike Helmet Not Asked    Seat Belt Not Asked    Self-Exams Not Asked   Social History Narrative    Not on file     Social Determinants of Health     Financial Resource Strain: Not on file   Food Insecurity: Not on file   Transportation Needs: Not on file   Physical Activity: Not on file   Stress: Not on file   Social Connections: Not on file   Housing Stability: Low Risk  (3/5/2024)    [facility]    Housing Stability     Mortgage Payment Concerns?: Not on file     Number of Places Lived in the Last Year: Not on file     Unstable Housing?: Not on file         Depression Screening (PHQ-2/PHQ-9): Over the LAST 2 WEEKS   Little interest or pleasure in doing things: Several days    Feeling down, depressed, or hopeless: Several days    PHQ-2 SCORE: 2   1. Little interest or pleasure in doing things: Several days  2. Feeling down, depressed, or hopeless: Several days  3.    4.    5.    6.    7.    8.    9.                 MEDICATIONS:  No current outpatient medications on file.    ALLERGIES:    Allergies   Allergen Reactions    Penicillins HIVES         Review of  Systems:  Review of Systems   All other systems reviewed and are negative.       Vitals:    05/09/24 1511   BP: 105/65   Pulse: 52       PHYSICAL EXAM:   Physical Exam  Vitals reviewed.   Constitutional:       Appearance: Normal appearance.   HENT:      Head: Atraumatic.   Eyes:      Pupils: Pupils are equal, round, and reactive to light.   Pulmonary:      Effort: Pulmonary effort is normal.   Chest:   Breasts:     Right: Normal. No bleeding, inverted nipple, mass, nipple discharge, skin change or tenderness.      Left: Normal. No bleeding, inverted nipple, mass, nipple discharge, skin change or tenderness.   Abdominal:      General: Abdomen is flat.      Palpations: Abdomen is soft.      Tenderness: There is no abdominal tenderness.   Genitourinary:     General: Normal vulva.      Exam position: Lithotomy position.      Labia:         Right: No rash, tenderness, lesion or injury.         Left: No rash, tenderness, lesion or injury.       Vagina: Normal.      Cervix: Normal.      Uterus: Normal. Not tender.       Adnexa: Right adnexa normal and left adnexa normal.        Right: No tenderness or fullness.          Left: No tenderness or fullness.     Lymphadenopathy:      Upper Body:      Right upper body: No supraclavicular, axillary or pectoral adenopathy.      Left upper body: No supraclavicular, axillary or pectoral adenopathy.   Skin:     General: Skin is warm and dry.   Neurological:      General: No focal deficit present.      Mental Status: She is alert and oriented to person, place, and time.   Psychiatric:         Mood and Affect: Mood normal.         Behavior: Behavior normal.         Thought Content: Thought content normal.         Judgment: Judgment normal.           Assessment & Plan:  Gretel was seen today for gyn exam.    Diagnoses and all orders for this visit:    Well woman exam with routine gynecological exam  -     ThinPrep PAP Smear; Future  -     Hpv Dna  High Risk , Thin Prep Collect;  Future  -     ThinPrep PAP Smear  -     Hpv Dna  High Risk , Thin Prep Collect  -     Image-Guided Pap Smear ([facility])    Screening for cervical cancer  -     ThinPrep PAP Smear; Future  -     Hpv Dna  High Risk , Thin Prep Collect; Future  -     ThinPrep PAP Smear  -     Hpv Dna  High Risk , Thin Prep Collect  -     Image-Guided Pap Smear ([facility])        Requested Prescriptions      No prescriptions requested or ordered in this encounter       [facility].  Annual exams encouraged. Call if any abnormal vaginal bleeding.  Mammogram completed.  SBE encouraged. Recommend trial of Trevin Delaney' Libido for low libido.

## 2024-05-10 LAB — HPV I/H RISK 1 DNA SPEC QL NAA+PROBE: NEGATIVE

## 2024-05-15 LAB
.: NORMAL
.: NORMAL

## 2024-10-30 ENCOUNTER — LAB ENCOUNTER (OUTPATIENT)
Dept: LAB | Age: 44
End: 2024-10-30
Attending: PHYSICIAN ASSISTANT
Payer: COMMERCIAL

## 2024-10-30 ENCOUNTER — OFFICE VISIT (OUTPATIENT)
Dept: FAMILY MEDICINE CLINIC | Facility: CLINIC | Age: 44
End: 2024-10-30

## 2024-10-30 ENCOUNTER — NURSE TRIAGE (OUTPATIENT)
Dept: FAMILY MEDICINE CLINIC | Facility: CLINIC | Age: 44
End: 2024-10-30

## 2024-10-30 VITALS
HEART RATE: 64 BPM | DIASTOLIC BLOOD PRESSURE: 64 MMHG | BODY MASS INDEX: 21.68 KG/M2 | SYSTOLIC BLOOD PRESSURE: 100 MMHG | WEIGHT: 127 LBS | HEIGHT: 64 IN

## 2024-10-30 DIAGNOSIS — D50.9 IRON DEFICIENCY ANEMIA, UNSPECIFIED IRON DEFICIENCY ANEMIA TYPE: ICD-10-CM

## 2024-10-30 DIAGNOSIS — M62.838 SPASM OF MUSCLE: Primary | ICD-10-CM

## 2024-10-30 LAB
ANION GAP SERPL CALC-SCNC: 7 MMOL/L (ref 0–18)
BUN BLD-MCNC: 12 MG/DL (ref 9–23)
BUN/CREAT SERPL: 16 (ref 10–20)
CALCIUM BLD-MCNC: 9 MG/DL (ref 8.7–10.4)
CHLORIDE SERPL-SCNC: 108 MMOL/L (ref 98–112)
CO2 SERPL-SCNC: 28 MMOL/L (ref 21–32)
CREAT BLD-MCNC: 0.75 MG/DL
EGFRCR SERPLBLD CKD-EPI 2021: 102 ML/MIN/1.73M2 (ref 60–?)
FASTING STATUS PATIENT QL REPORTED: NO
GLUCOSE BLD-MCNC: 97 MG/DL (ref 70–99)
OSMOLALITY SERPL CALC.SUM OF ELEC: 296 MOSM/KG (ref 275–295)
POTASSIUM SERPL-SCNC: 3.8 MMOL/L (ref 3.5–5.1)
SODIUM SERPL-SCNC: 143 MMOL/L (ref 136–145)

## 2024-10-30 PROCEDURE — 3074F SYST BP LT 130 MM HG: CPT | Performed by: PHYSICIAN ASSISTANT

## 2024-10-30 PROCEDURE — 85025 COMPLETE CBC W/AUTO DIFF WBC: CPT

## 2024-10-30 PROCEDURE — 3078F DIAST BP <80 MM HG: CPT | Performed by: PHYSICIAN ASSISTANT

## 2024-10-30 PROCEDURE — 99213 OFFICE O/P EST LOW 20 MIN: CPT | Performed by: PHYSICIAN ASSISTANT

## 2024-10-30 PROCEDURE — 36415 COLL VENOUS BLD VENIPUNCTURE: CPT

## 2024-10-30 PROCEDURE — 85060 BLOOD SMEAR INTERPRETATION: CPT

## 2024-10-30 PROCEDURE — 3008F BODY MASS INDEX DOCD: CPT | Performed by: PHYSICIAN ASSISTANT

## 2024-10-30 PROCEDURE — 80048 BASIC METABOLIC PNL TOTAL CA: CPT

## 2024-10-30 RX ORDER — NAPROXEN 500 MG/1
500 TABLET ORAL 2 TIMES DAILY PRN
Qty: 60 TABLET | Refills: 0 | Status: SHIPPED | OUTPATIENT
Start: 2024-10-30

## 2024-10-30 RX ORDER — CYCLOBENZAPRINE HCL 10 MG
10 TABLET ORAL NIGHTLY
Qty: 30 TABLET | Refills: 0 | Status: SHIPPED | OUTPATIENT
Start: 2024-10-30 | End: 2024-11-29

## 2024-10-30 NOTE — TELEPHONE ENCOUNTER
Action Requested: Summary for Provider     []  Critical Lab, Recommendations Needed  [] Need Additional Advice  [x]   FYI    []   Need Orders  [] Need Medications Sent to Pharmacy  []  Other     SUMMARY: Per protocol, patient should be seen in the office today. Appointment scheduled.    Future Appointments   Date Time Provider Department Center   10/30/2024  2:30 PM [facility]     Reason for call: Dizziness  Onset: 10/27    Patient called requesting order to check for hemoglobin and potassium level. When asked further the reason for request, reports she has been experiencing dizziness for the past few days. It was worse this past Sunday where she felt dizzy all day. Usually feels it in the morning mostly, occasionally for a few minutes throughout the day. States it feels like room is spinning or feels she is she is moving when she is just sitting. Denies fatigue but states that she does feel short of breath with exertion such as going up the stairs. She stopped exercising early this year because of this.    She complains of intermittent lower abdominal pain for the past 2 weeks now, occasionally wants to throw up but no vomiting. Normal bowel movement. Also states she has pain in the arms and legs. She recalls experiencing the same pain when she had low potassium level in the past.     Advised to schedule an appointment for evaluation. Care advice given per protocol. Patient verbalized understanding and agreed with plan of care.     Reason for Disposition   Patient wants to be seen    Protocols used: Dizziness-A-OH

## 2024-10-30 NOTE — PROGRESS NOTES
HPI:     HPI  A 44-year-old female is in the office complaining of right leg pain for the past 2 days. She also has intermittent left upper back pain and right arm pain for the past month. She has tried Tylenol with mild relief.  She denies fever, N/V, numbness, tingling sensation, weakness, redness, swelling, or leg injury.      Medications:     Current Outpatient Medications   Medication Sig Dispense Refill    naproxen 500 MG Oral Tab Take 1 tablet (500 mg total) by mouth 2 (two) times daily as needed. 60 tablet 0    cyclobenzaprine 10 MG Oral Tab Take 1 tablet (10 mg total) by mouth nightly. 30 tablet 0       Allergies:   Allergies[1]    History:     Health Maintenance   Topic Date Due    Annual Physical  Never done    DTaP,Tdap,and Td Vaccines (1 - Tdap) Never done    COVID-19 Vaccine (4 -  season) 2024    Influenza Vaccine (1) 10/01/2024    Mammogram  2025    Pap Smear  2027    Annual Depression Screening  Completed    Pneumococcal Vaccine: Birth to 64yrs  Aged Out       Patient's last menstrual period was 10/03/2024 (approximate).   Past Medical History:     Past Medical History:    Anemia    per NextGen:  \"Anemia, unknown cause\"; \"Management:  packed cell transfusion\"    Blood disorder    anemia, Vit B12 deficiency    Ganglion of right hand    History of blood transfusion     , no reactions    Hyperthyroidism    Lipid screening    per     Pregnancy (HCC)    per NextGen:  \"Pregnancy; Management:  ; Facility:  [facility]; Outcome/detail:  40 week 6 lb(s) 12 oz Female\"    Pregnancy (HCC)    per NextGen:  \"Pregnancy; Management:  induced ; Outcome/detail:  Unknown sex\"    Pregnancy (HCC)    per NextGen:  \"Pregnancy; Management:   (10-); Provider:   Chris Quintero; Comments:  APGAR:  9 (1 min), 9 (5 min) (10 min) - 2nd degree perineal laceration\"    Visual impairment    eyeglasses       Past Surgical History:     Past Surgical History:   Procedure  Laterality Date    Excis tendon sheath lesn,hand/fingr Right 2019    Excision of ganglion cyst of right palm      , 2012    Other surgical history      per NextGen:  \"(3) -induced\"       Family History:     Family History   Problem Relation Age of Onset    Other (Mitral valve placement) Sister     Hypertension Father     Other (Other) Father         stroke    Breast Cancer Maternal Aunt         40's       Social History:     Social History     Socioeconomic History    Marital status:      Spouse name: Not on file    Number of children: Not on file    Years of education: Not on file    Highest education level: Not on file   Occupational History    Not on file   Tobacco Use    Smoking status: Never    Smokeless tobacco: Never   Vaping Use    Vaping status: Never Used   Substance and Sexual Activity    Alcohol use: Yes     Alcohol/week: 0.0 standard drinks of alcohol     Comment: occasionally    Drug use: No    Sexual activity: Not on file   Other Topics Concern     Service Not Asked    Blood Transfusions Not Asked    Caffeine Concern No    Occupational Exposure Not Asked    Hobby Hazards Not Asked    Sleep Concern Not Asked    Stress Concern Not Asked    Weight Concern Not Asked    Special Diet Not Asked    Back Care Not Asked    Exercise Not Asked    Bike Helmet Not Asked    Seat Belt Not Asked    Self-Exams Not Asked   Social History Narrative    Not on file     Social Drivers of Health     Financial Resource Strain: Not on file   Food Insecurity: Not on file   Transportation Needs: Not on file   Physical Activity: Not on file   Stress: Not on file   Social Connections: Not on file   Housing Stability: Low Risk  (3/5/2024)    [facility]    Housing Stability     Mortgage Payment Concerns?: Not on file     Number of Places Lived in the Last Year: Not on file     Unstable Housing?: Not on file       Review of Systems:    Review of Systems   Musculoskeletal:  Positive for back pain.        Vitals:    10/30/24 1417   BP: 100/64   Pulse: 64   Weight: 127 lb (57.6 kg)   Height: 5' 4\" (1.626 m)     Body mass index is 21.8 kg/m².    Physical Exam:   Physical Exam  Vitals reviewed.      Inspection: Neck and spine have no noted deformities or signs of inflammation. Curvature of cervical, thoracic, and lumbar spine are within normal limits. Posture is upright, and gait is smooth and normal.  Palpation: Spinous processed of C7-L5 palpable, midline, and non- tender.  Flexion, extension, and side to side rotation of cervical spine causes no discomfort.  Bilateral arm and leg: no swelling, no erythema, no tenderness to palpation.      Assessment and Plan::     Problem List Items Addressed This Visit          Hematology and Neoplasia    Anemia    Relevant Medications        Other Relevant Orders    CBC With Differential With Platelet (Completed)    Basic Metabolic Panel (8) (Completed)     Other Visit Diagnoses       Spasm of muscle    -  Primary    Relevant Medications    naproxen 500 MG Oral Tab    cyclobenzaprine 10 MG Oral Tab          Discussed plan of care with pt and pt is in agreement.All questions answered. Pt to call with questions or concerns.         [1]   Allergies  Allergen Reactions    Penicillins HIVES

## 2024-10-31 LAB
BASOPHILS # BLD AUTO: 0.02 X10(3) UL (ref 0–0.2)
BASOPHILS NFR BLD AUTO: 0.3 %
DEPRECATED RDW RBC AUTO: 46.3 FL (ref 35.1–46.3)
EOSINOPHIL # BLD AUTO: 0.13 X10(3) UL (ref 0–0.7)
EOSINOPHIL NFR BLD AUTO: 2 %
ERYTHROCYTE [DISTWIDTH] IN BLOOD BY AUTOMATED COUNT: 24.5 % (ref 11–15)
HCT VFR BLD AUTO: 29.7 %
HGB BLD-MCNC: 8.7 G/DL
IMM GRANULOCYTES # BLD AUTO: 0.02 X10(3) UL (ref 0–1)
IMM GRANULOCYTES NFR BLD: 0.3 %
LYMPHOCYTES # BLD AUTO: 1.79 X10(3) UL (ref 1–4)
LYMPHOCYTES NFR BLD AUTO: 27.8 %
MCH RBC QN AUTO: 17 PG (ref 26–34)
MCHC RBC AUTO-ENTMCNC: 29.3 G/DL (ref 31–37)
MCV RBC AUTO: 58 FL
MONOCYTES # BLD AUTO: 0.38 X10(3) UL (ref 0.1–1)
MONOCYTES NFR BLD AUTO: 5.9 %
NEUTROPHILS # BLD AUTO: 4.09 X10 (3) UL (ref 1.5–7.7)
NEUTROPHILS # BLD AUTO: 4.09 X10(3) UL (ref 1.5–7.7)
NEUTROPHILS NFR BLD AUTO: 63.7 %
PLATELET # BLD AUTO: 233 10(3)UL (ref 150–450)
PLATELET MORPHOLOGY: NORMAL
PLATELETS.RETICULATED NFR BLD AUTO: 5.8 % (ref 0–7)
RBC # BLD AUTO: 5.12 X10(6)UL
WBC # BLD AUTO: 6.4 X10(3) UL (ref 4–11)

## 2024-11-18 ENCOUNTER — APPOINTMENT (OUTPATIENT)
Dept: HEMATOLOGY/ONCOLOGY | Facility: HOSPITAL | Age: 44
End: 2024-11-18
Attending: INTERNAL MEDICINE
Payer: COMMERCIAL

## 2025-02-18 ENCOUNTER — NURSE TRIAGE (OUTPATIENT)
Dept: FAMILY MEDICINE CLINIC | Facility: CLINIC | Age: 45
End: 2025-02-18

## 2025-02-18 ENCOUNTER — EKG ENCOUNTER (OUTPATIENT)
Dept: LAB | Age: 45
End: 2025-02-18
Attending: FAMILY MEDICINE
Payer: COMMERCIAL

## 2025-02-18 ENCOUNTER — OFFICE VISIT (OUTPATIENT)
Dept: FAMILY MEDICINE CLINIC | Facility: CLINIC | Age: 45
End: 2025-02-18
Payer: COMMERCIAL

## 2025-02-18 ENCOUNTER — LAB ENCOUNTER (OUTPATIENT)
Dept: LAB | Age: 45
End: 2025-02-18
Attending: FAMILY MEDICINE
Payer: COMMERCIAL

## 2025-02-18 VITALS
HEIGHT: 64 IN | WEIGHT: 129.81 LBS | DIASTOLIC BLOOD PRESSURE: 72 MMHG | OXYGEN SATURATION: 98 % | RESPIRATION RATE: 12 BRPM | HEART RATE: 66 BPM | SYSTOLIC BLOOD PRESSURE: 106 MMHG | BODY MASS INDEX: 22.16 KG/M2

## 2025-02-18 DIAGNOSIS — R00.2 PALPITATIONS: ICD-10-CM

## 2025-02-18 DIAGNOSIS — R53.83 OTHER FATIGUE: Primary | ICD-10-CM

## 2025-02-18 DIAGNOSIS — D56.0 HEMOGLOBIN H DISEASE (HCC): ICD-10-CM

## 2025-02-18 DIAGNOSIS — R53.83 OTHER FATIGUE: ICD-10-CM

## 2025-02-18 LAB
ANION GAP SERPL CALC-SCNC: 8 MMOL/L (ref 0–18)
BASOPHILS # BLD AUTO: 0.02 X10(3) UL (ref 0–0.2)
BASOPHILS NFR BLD AUTO: 0.4 %
BILIRUB UR QL: NEGATIVE
BUN BLD-MCNC: 10 MG/DL (ref 9–23)
BUN/CREAT SERPL: 15.4 (ref 10–20)
CALCIUM BLD-MCNC: 8.9 MG/DL (ref 8.7–10.4)
CHLORIDE SERPL-SCNC: 103 MMOL/L (ref 98–112)
CLARITY UR: CLEAR
CO2 SERPL-SCNC: 28 MMOL/L (ref 21–32)
CREAT BLD-MCNC: 0.65 MG/DL
DEPRECATED RDW RBC AUTO: 46.4 FL (ref 35.1–46.3)
EGFRCR SERPLBLD CKD-EPI 2021: 112 ML/MIN/1.73M2 (ref 60–?)
EOSINOPHIL # BLD AUTO: 0.14 X10(3) UL (ref 0–0.7)
EOSINOPHIL NFR BLD AUTO: 2.7 %
ERYTHROCYTE [DISTWIDTH] IN BLOOD BY AUTOMATED COUNT: 24.1 % (ref 11–15)
EST. AVERAGE GLUCOSE BLD GHB EST-MCNC: 82 MG/DL (ref 68–126)
FASTING STATUS PATIENT QL REPORTED: NO
GLUCOSE BLD-MCNC: 89 MG/DL (ref 70–99)
GLUCOSE UR-MCNC: NORMAL MG/DL
HBA1C MFR BLD: 4.5 % (ref ?–5.7)
HCG SERPL QL: NEGATIVE
HCT VFR BLD AUTO: 30.9 %
HGB BLD-MCNC: 9 G/DL
HGB UR QL STRIP.AUTO: NEGATIVE
IMM GRANULOCYTES # BLD AUTO: 0.01 X10(3) UL (ref 0–1)
IMM GRANULOCYTES NFR BLD: 0.2 %
KETONES UR-MCNC: NEGATIVE MG/DL
LEUKOCYTE ESTERASE UR QL STRIP.AUTO: NEGATIVE
LYMPHOCYTES # BLD AUTO: 2.14 X10(3) UL (ref 1–4)
LYMPHOCYTES NFR BLD AUTO: 41.6 %
MCH RBC QN AUTO: 17 PG (ref 26–34)
MCHC RBC AUTO-ENTMCNC: 29.1 G/DL (ref 31–37)
MCV RBC AUTO: 58.3 FL
MONOCYTES # BLD AUTO: 0.37 X10(3) UL (ref 0.1–1)
MONOCYTES NFR BLD AUTO: 7.2 %
NEUTROPHILS # BLD AUTO: 2.46 X10 (3) UL (ref 1.5–7.7)
NEUTROPHILS # BLD AUTO: 2.46 X10(3) UL (ref 1.5–7.7)
NEUTROPHILS NFR BLD AUTO: 47.9 %
NITRITE UR QL STRIP.AUTO: NEGATIVE
OSMOLALITY SERPL CALC.SUM OF ELEC: 287 MOSM/KG (ref 275–295)
PH UR: 6.5 [PH] (ref 5–8)
PLATELET # BLD AUTO: 233 10(3)UL (ref 150–450)
PLATELET MORPHOLOGY: NORMAL
PLATELETS.RETICULATED NFR BLD AUTO: 6.2 % (ref 0–7)
POTASSIUM SERPL-SCNC: 4 MMOL/L (ref 3.5–5.1)
PROT UR-MCNC: NEGATIVE MG/DL
RBC # BLD AUTO: 5.3 X10(6)UL
SODIUM SERPL-SCNC: 139 MMOL/L (ref 136–145)
SP GR UR STRIP: 1.01 (ref 1–1.03)
T4 FREE SERPL-MCNC: 1.2 NG/DL (ref 0.8–1.7)
TSI SER-ACNC: 5.12 UIU/ML (ref 0.55–4.78)
UROBILINOGEN UR STRIP-ACNC: NORMAL
WBC # BLD AUTO: 5.1 X10(3) UL (ref 4–11)

## 2025-02-18 PROCEDURE — 36415 COLL VENOUS BLD VENIPUNCTURE: CPT

## 2025-02-18 PROCEDURE — 3008F BODY MASS INDEX DOCD: CPT | Performed by: FAMILY MEDICINE

## 2025-02-18 PROCEDURE — 3074F SYST BP LT 130 MM HG: CPT | Performed by: FAMILY MEDICINE

## 2025-02-18 PROCEDURE — 93010 ELECTROCARDIOGRAM REPORT: CPT | Performed by: INTERNAL MEDICINE

## 2025-02-18 PROCEDURE — 84443 ASSAY THYROID STIM HORMONE: CPT

## 2025-02-18 PROCEDURE — 93005 ELECTROCARDIOGRAM TRACING: CPT

## 2025-02-18 PROCEDURE — 85025 COMPLETE CBC W/AUTO DIFF WBC: CPT

## 2025-02-18 PROCEDURE — 80048 BASIC METABOLIC PNL TOTAL CA: CPT

## 2025-02-18 PROCEDURE — 83036 HEMOGLOBIN GLYCOSYLATED A1C: CPT

## 2025-02-18 PROCEDURE — 3078F DIAST BP <80 MM HG: CPT | Performed by: FAMILY MEDICINE

## 2025-02-18 PROCEDURE — 81003 URINALYSIS AUTO W/O SCOPE: CPT | Performed by: FAMILY MEDICINE

## 2025-02-18 PROCEDURE — 84703 CHORIONIC GONADOTROPIN ASSAY: CPT

## 2025-02-18 PROCEDURE — 99213 OFFICE O/P EST LOW 20 MIN: CPT | Performed by: FAMILY MEDICINE

## 2025-02-18 PROCEDURE — 84439 ASSAY OF FREE THYROXINE: CPT

## 2025-02-18 NOTE — PROGRESS NOTES
Blood pressure 106/72, pulse 66, resp. rate 12, height 5' 4\" (1.626 m), weight 129 lb 12.8 oz (58.9 kg), last menstrual period 02/15/2025, SpO2 98%.          Presents today complaining of fatigue.  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  She reports that she feels out of breath at times but not right now.  She did not feel out of breath walking into the office from the parking lot.  She denies any exertional chest pain.  Works a factory job.  Sleeps 6 to 7 hours at night and then naps for 3 to 4 hours in the afternoon.  She has regular menses monthly.    Objective    Heart regular rate rhythm no murmurs    Neck supple no carotid bruits    Lungs clear to auscultation no rales rhonchi or wheezes    Assessment fatigue history of anemia    Plan blood test ordered for today also EKG    Will follow with results we will check urinalysis

## 2025-02-18 NOTE — TELEPHONE ENCOUNTER
Action Requested: Summary for Provider     []  Critical Lab, Recommendations Needed  [] Need Additional Advice  []   FYI    []   Need Orders  [] Need Medications Sent to Pharmacy  []  Other     SUMMARY: Patient reports fatigue x 2 weeks.  Per protocol, scheduled appointment to evaluate.    Reason for call: Fatigue  Onset: 2 weeks    Spoke with patient,  verified.   Reports occasional dizziness.  Denies syncope.  Sometimes feels shortness of breath.    Reason for Disposition   Patient wants to be seen    Protocols used: Weakness (Generalized) and Fatigue-A-OH

## 2025-02-19 LAB
ATRIAL RATE: 55 BPM
P AXIS: 67 DEGREES
P-R INTERVAL: 166 MS
Q-T INTERVAL: 422 MS
QRS DURATION: 80 MS
QTC CALCULATION (BEZET): 403 MS
R AXIS: 89 DEGREES
T AXIS: 81 DEGREES
VENTRICULAR RATE: 55 BPM

## 2025-05-01 ENCOUNTER — LAB ENCOUNTER (OUTPATIENT)
Dept: LAB | Age: 45
End: 2025-05-01
Attending: FAMILY MEDICINE
Payer: COMMERCIAL

## 2025-05-01 ENCOUNTER — OFFICE VISIT (OUTPATIENT)
Dept: FAMILY MEDICINE CLINIC | Facility: CLINIC | Age: 45
End: 2025-05-01

## 2025-05-01 VITALS
HEART RATE: 64 BPM | SYSTOLIC BLOOD PRESSURE: 97 MMHG | BODY MASS INDEX: 22.36 KG/M2 | DIASTOLIC BLOOD PRESSURE: 58 MMHG | HEIGHT: 64 IN | WEIGHT: 131 LBS

## 2025-05-01 DIAGNOSIS — S39.012A STRAIN OF LUMBAR REGION, INITIAL ENCOUNTER: Primary | ICD-10-CM

## 2025-05-01 DIAGNOSIS — E03.9 HYPOTHYROIDISM, UNSPECIFIED TYPE: ICD-10-CM

## 2025-05-01 DIAGNOSIS — R00.2 PALPITATIONS: ICD-10-CM

## 2025-05-01 DIAGNOSIS — D64.9 ANEMIA, UNSPECIFIED TYPE: ICD-10-CM

## 2025-05-01 DIAGNOSIS — R06.02 SOB (SHORTNESS OF BREATH): ICD-10-CM

## 2025-05-01 LAB
BASOPHILS # BLD AUTO: 0.01 X10(3) UL (ref 0–0.2)
BASOPHILS NFR BLD AUTO: 0.1 %
DEPRECATED RDW RBC AUTO: 43.4 FL (ref 35.1–46.3)
EOSINOPHIL # BLD AUTO: 0.13 X10(3) UL (ref 0–0.7)
EOSINOPHIL NFR BLD AUTO: 1.9 %
ERYTHROCYTE [DISTWIDTH] IN BLOOD BY AUTOMATED COUNT: 23.3 % (ref 11–15)
HCT VFR BLD AUTO: 29.5 % (ref 35–48)
HGB BLD-MCNC: 8.5 G/DL (ref 12–16)
IMM GRANULOCYTES # BLD AUTO: 0.01 X10(3) UL (ref 0–1)
IMM GRANULOCYTES NFR BLD: 0.1 %
LYMPHOCYTES # BLD AUTO: 2.12 X10(3) UL (ref 1–4)
LYMPHOCYTES NFR BLD AUTO: 31.3 %
MCH RBC QN AUTO: 16.8 PG (ref 26–34)
MCHC RBC AUTO-ENTMCNC: 28.8 G/DL (ref 31–37)
MCV RBC AUTO: 58.3 FL (ref 80–100)
MONOCYTES # BLD AUTO: 0.41 X10(3) UL (ref 0.1–1)
MONOCYTES NFR BLD AUTO: 6.1 %
NEUTROPHILS # BLD AUTO: 4.09 X10 (3) UL (ref 1.5–7.7)
NEUTROPHILS # BLD AUTO: 4.09 X10(3) UL (ref 1.5–7.7)
NEUTROPHILS NFR BLD AUTO: 60.5 %
PLATELET # BLD AUTO: 222 10(3)UL (ref 150–450)
PLATELET MORPHOLOGY: NORMAL
PLATELETS.RETICULATED NFR BLD AUTO: 5.9 % (ref 0–7)
RBC # BLD AUTO: 5.06 X10(6)UL (ref 3.8–5.3)
VIT D+METAB SERPL-MCNC: 17.8 NG/ML (ref 30–100)
WBC # BLD AUTO: 6.8 X10(3) UL (ref 4–11)

## 2025-05-01 PROCEDURE — 3074F SYST BP LT 130 MM HG: CPT | Performed by: FAMILY MEDICINE

## 2025-05-01 PROCEDURE — 3078F DIAST BP <80 MM HG: CPT | Performed by: FAMILY MEDICINE

## 2025-05-01 PROCEDURE — 99214 OFFICE O/P EST MOD 30 MIN: CPT | Performed by: FAMILY MEDICINE

## 2025-05-01 PROCEDURE — 3008F BODY MASS INDEX DOCD: CPT | Performed by: FAMILY MEDICINE

## 2025-05-01 PROCEDURE — 36415 COLL VENOUS BLD VENIPUNCTURE: CPT

## 2025-05-01 PROCEDURE — 85025 COMPLETE CBC W/AUTO DIFF WBC: CPT

## 2025-05-01 PROCEDURE — 82306 VITAMIN D 25 HYDROXY: CPT

## 2025-05-01 RX ORDER — FOLIC ACID 1 MG/1
TABLET ORAL
COMMUNITY
Start: 2025-04-09

## 2025-05-01 RX ORDER — LEVOTHYROXINE SODIUM 50 UG/1
TABLET ORAL
COMMUNITY
Start: 2025-04-09

## 2025-05-01 NOTE — PROGRESS NOTES
Blood pressure 97/58, pulse 64, height 5' 4\" (1.626 m), weight 131 lb (59.4 kg), last menstrual period 02/15/2025.      Patient presents today following up for low back pain and fatigue.  She has had a low back pain for 2 months.  Some relief with Tylenol.  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  She denies any trauma.  Continues to have fatigue.  Sister has anemia also and received a blood transfusion.  Pain is located in right lumbar area.  Also patient complains of intermittent palpitations.  She has never had any syncope.  She does get occasional difficulty breathing.  No chest pain.    Objective some tenderness noted in right lumbar area    No CVA tenderness  Heart regular rate rhythm no murmurs    Neck supple no carotid bruits      Assessment low back pain and fatigue with history of anemia    Also palpitations and dyspnea      And hypothyroidism  Plan blood test ordered for today    Physical therapy order    Tylenol for pain  Cardio extended monitor ordered with cardiology referral    2D echo ordered

## 2025-05-08 ENCOUNTER — HOSPITAL ENCOUNTER (OUTPATIENT)
Dept: CV DIAGNOSTICS | Facility: HOSPITAL | Age: 45
Discharge: HOME | End: 2025-05-08
Attending: FAMILY MEDICINE
Payer: COMMERCIAL

## 2025-05-08 DIAGNOSIS — R06.02 SOB (SHORTNESS OF BREATH): ICD-10-CM

## 2025-05-08 DIAGNOSIS — R00.2 PALPITATIONS: ICD-10-CM

## 2025-05-08 PROCEDURE — 93306 TTE W/DOPPLER COMPLETE: CPT | Performed by: FAMILY MEDICINE

## 2025-05-08 PROCEDURE — 76376 3D RENDER W/INTRP POSTPROCES: CPT | Performed by: FAMILY MEDICINE

## 2025-05-08 PROCEDURE — 93246 EXT ECG>7D<15D RECORDING: CPT | Performed by: FAMILY MEDICINE

## 2025-05-08 PROCEDURE — 93247 EXT ECG>7D<15D SCAN A/R: CPT | Performed by: FAMILY MEDICINE

## 2025-07-22 ENCOUNTER — OFFICE VISIT (OUTPATIENT)
Dept: FAMILY MEDICINE CLINIC | Facility: CLINIC | Age: 45
End: 2025-07-22
Payer: COMMERCIAL

## 2025-07-22 ENCOUNTER — TELEPHONE (OUTPATIENT)
Dept: FAMILY MEDICINE CLINIC | Facility: CLINIC | Age: 45
End: 2025-07-22

## 2025-07-22 VITALS
HEART RATE: 62 BPM | BODY MASS INDEX: 21.85 KG/M2 | DIASTOLIC BLOOD PRESSURE: 56 MMHG | HEIGHT: 64 IN | SYSTOLIC BLOOD PRESSURE: 91 MMHG | WEIGHT: 128 LBS

## 2025-07-22 DIAGNOSIS — Z12.31 SCREENING MAMMOGRAM, ENCOUNTER FOR: Primary | ICD-10-CM

## 2025-07-22 DIAGNOSIS — R25.1 TREMOR: ICD-10-CM

## 2025-07-22 DIAGNOSIS — M54.50 CHRONIC BILATERAL LOW BACK PAIN WITHOUT SCIATICA: ICD-10-CM

## 2025-07-22 DIAGNOSIS — E03.9 HYPOTHYROIDISM, UNSPECIFIED TYPE: ICD-10-CM

## 2025-07-22 DIAGNOSIS — G89.29 CHRONIC BILATERAL LOW BACK PAIN WITHOUT SCIATICA: ICD-10-CM

## 2025-07-22 DIAGNOSIS — D64.9 ANEMIA, UNSPECIFIED TYPE: ICD-10-CM

## 2025-07-22 PROCEDURE — 3008F BODY MASS INDEX DOCD: CPT | Performed by: FAMILY MEDICINE

## 2025-07-22 PROCEDURE — 99214 OFFICE O/P EST MOD 30 MIN: CPT | Performed by: FAMILY MEDICINE

## 2025-07-22 PROCEDURE — 3074F SYST BP LT 130 MM HG: CPT | Performed by: FAMILY MEDICINE

## 2025-07-22 PROCEDURE — 3078F DIAST BP <80 MM HG: CPT | Performed by: FAMILY MEDICINE

## 2025-07-22 RX ORDER — METHYLPREDNISOLONE 4 MG/1
TABLET ORAL
Qty: 1 EACH | Refills: 1 | Status: SHIPPED | OUTPATIENT
Start: 2025-07-22

## 2025-07-22 NOTE — PROGRESS NOTES
Subjective:   Gretel Guardado is a 45 year old female who presents for Low Back Pain (x3m)       History/Other:   History of Present Illness  Gretel Guardado is a 45 year old female who presents with low back pain and head tremors.    She has experienced low back pain for three months without any falls or accidents. Her job in  involves prolonged sitting and walking, which worsens the pain. The pain disrupts her sleep at times she has difficulty standing after sitting or cleaning the floor.    Head tremors began concurrently with the back pain, occurring when she turns her neck to either side. Her hands also shake, especially when her head is shaking, making it difficult to hold hot water. Her father has similar tremors, suggesting a familial link.   Chief Complaint Reviewed and Verified  Nursing Notes Reviewed and   Verified  Tobacco Reviewed  Allergies Reviewed  Medications Reviewed    Medical History Reviewed  Surgical History Reviewed  Family History   Reviewed  Social History Reviewed         Tobacco:  She has never smoked tobacco.    Current Medications[1]           Review of Systems:  Pertinent items are noted in HPI.      Objective:   BP 91/56 (BP Location: Left arm, Patient Position: Sitting)   Pulse 62   Ht 5' 4\" (1.626 m)   Wt 128 lb (58.1 kg)   LMP 02/15/2025 (Approximate)   BMI 21.97 kg/m²  Estimated body mass index is 21.97 kg/m² as calculated from the following:    Height as of this encounter: 5' 4\" (1.626 m).    Weight as of this encounter: 128 lb (58.1 kg).  Results         Physical Exam  MUSCULOSKELETAL: Hips non-tender with normal range of motion. Knees with normal strength and range of motion.  NEUROLOGICAL: Hand tremor observed. Normal proprioception. Moves all extremities without gross motor or sensory deficit.  Good blink suppression    L4-S1 motor intact bilaterally    Negative straight leg raise bilaterally    Negative FADIR  testing    Negative SUMAYA testing    Back without spinous process tenderness        Assessment & Plan:   1. Screening mammogram, encounter for (Primary)  -     [facility] IRIS 2D+3D SCREENING BILAT (CPT=77067/70866); Future; Expected date: 07/22/2025  2. Hypothyroidism, unspecified type  -     Hemoglobin & Hematocrit; Future; Expected date: 07/22/2025  -     Thyroid Peroxidase (TPO) AB; Future; Expected date: 07/22/2025  -     Thyroid Antithyroglobulin AB; Future; Expected date: 07/22/2025  -     TSH W Reflex To Free T4; Future; Expected date: 07/22/2025  3. Chronic bilateral low back pain without sciatica  -     Physical Therapy Referral - [facility]  4. Tremor  -     NEURO - INTERNAL  Other orders  -     methylPREDNISolone; As directed.  Dispense: 1 each; Refill: 1    Assessment & Plan  Low back pain  Chronic low back pain for three months, exacerbated by movements such as sitting and cleaning. No history of falls or accidents. Pain disrupts sleep and is partially relieved by medication. She works in , involving sitting and walking. A Medrol Dosepak is prescribed to reduce inflammation, with reassurance that it should not interfere with work or drug testing as it is not a narcotic.  - Prescribe Medrol Dosepak for inflammation  - Refer to physical therapy in [place] for long-term management    Essential tremor  Reports head shaking when turning neck, accompanied by hand tremors. Family history of similar symptoms in father. Symptoms suggestive of essential tremor, but further evaluation by neurology is recommended to rule out other causes.  - Refer to neurologist, Dr. Hurt, for further evaluation of tremor  - Provide information about essential tremor    Anemia  Anemia is a concern, and blood tests are needed to evaluate the condition further. She expresses a desire to understand and manage the condition.  - Order blood test to evaluate anemia    Hypothyroidism  Hypothyroidism is a known  condition. Blood tests are needed to assess current thyroid function.  - Order blood test to evaluate thyroid function    General Health Maintenance  She is due for a mammogram, as the last one was in March 2024. Regular screenings are important for early detection of breast cancer.  - Order mammogram    Recording duration: 8 minutes        No follow-ups on file.        Raza Blair DO, 7/22/2025, 5:37 PM             [1]   Current Outpatient Medications   Medication Sig Dispense Refill    methylPREDNISolone (MEDROL) 4 MG Oral Tablet Therapy Pack As directed. 1 each 1    folic acid 1 MG Oral Tab

## 2025-07-23 ENCOUNTER — LAB ENCOUNTER (OUTPATIENT)
Dept: LAB | Age: 45
End: 2025-07-23
Attending: FAMILY MEDICINE
Payer: COMMERCIAL

## 2025-07-23 DIAGNOSIS — E03.9 HYPOTHYROIDISM, UNSPECIFIED TYPE: ICD-10-CM

## 2025-07-23 LAB
HCT VFR BLD AUTO: 29.4 % (ref 35–48)
HGB BLD-MCNC: 8.7 G/DL (ref 12–16)
THYROGLOB SERPL-MCNC: <15 U/ML (ref ?–60)
THYROPEROXIDASE AB SERPL-ACNC: 50 U/ML (ref ?–60)
TSI SER-ACNC: 3.48 UIU/ML (ref 0.55–4.78)

## 2025-07-23 PROCEDURE — 84443 ASSAY THYROID STIM HORMONE: CPT

## 2025-07-23 PROCEDURE — 85018 HEMOGLOBIN: CPT

## 2025-07-23 PROCEDURE — 85014 HEMATOCRIT: CPT

## 2025-07-23 PROCEDURE — 86800 THYROGLOBULIN ANTIBODY: CPT

## 2025-07-23 PROCEDURE — 36415 COLL VENOUS BLD VENIPUNCTURE: CPT

## 2025-07-23 PROCEDURE — 86376 MICROSOMAL ANTIBODY EACH: CPT

## 2025-07-25 ENCOUNTER — HOSPITAL ENCOUNTER (OUTPATIENT)
Dept: MAMMOGRAPHY | Age: 45
Discharge: HOME | End: 2025-07-25
Attending: FAMILY MEDICINE
Payer: COMMERCIAL

## 2025-07-25 DIAGNOSIS — Z12.31 SCREENING MAMMOGRAM, ENCOUNTER FOR: ICD-10-CM

## 2025-07-25 PROCEDURE — 77067 SCR MAMMO BI INCL CAD: CPT | Performed by: FAMILY MEDICINE

## 2025-07-25 PROCEDURE — 77063 BREAST TOMOSYNTHESIS BI: CPT | Performed by: FAMILY MEDICINE

## 2025-08-13 ENCOUNTER — OFFICE VISIT (OUTPATIENT)
Dept: FAMILY MEDICINE CLINIC | Facility: CLINIC | Age: 45
End: 2025-08-13

## 2025-08-13 VITALS
BODY MASS INDEX: 21.51 KG/M2 | WEIGHT: 126 LBS | HEIGHT: 64 IN | HEART RATE: 68 BPM | DIASTOLIC BLOOD PRESSURE: 66 MMHG | SYSTOLIC BLOOD PRESSURE: 101 MMHG

## 2025-08-13 DIAGNOSIS — M54.9 BACK PAIN WITHOUT SCIATICA: ICD-10-CM

## 2025-08-13 DIAGNOSIS — D64.9 ANEMIA, UNSPECIFIED TYPE: ICD-10-CM

## 2025-08-13 DIAGNOSIS — Z00.00 WELLNESS EXAMINATION: Primary | ICD-10-CM

## 2025-08-13 RX ORDER — MULTIVITAMIN WITH IRON
50 TABLET ORAL DAILY
COMMUNITY

## 2025-08-14 ENCOUNTER — LAB ENCOUNTER (OUTPATIENT)
Dept: LAB | Age: 45
End: 2025-08-14
Attending: PHYSICIAN ASSISTANT

## 2025-08-14 DIAGNOSIS — Z00.00 WELLNESS EXAMINATION: ICD-10-CM

## 2025-08-14 PROBLEM — D56.9 THALASSANEMIA: Status: ACTIVE | Noted: 2025-08-14

## 2025-08-14 LAB
ALBUMIN SERPL-MCNC: 4.4 G/DL (ref 3.2–4.8)
ALBUMIN/GLOB SERPL: 1.3 (ref 1–2)
ALP LIVER SERPL-CCNC: 34 U/L (ref 37–98)
ALT SERPL-CCNC: 9 U/L (ref 10–49)
ANION GAP SERPL CALC-SCNC: 8 MMOL/L (ref 0–18)
AST SERPL-CCNC: 13 U/L (ref ?–34)
BASOPHILS # BLD AUTO: 0.02 X10(3) UL (ref 0–0.2)
BASOPHILS NFR BLD AUTO: 0.3 %
BILIRUB SERPL-MCNC: 1.8 MG/DL (ref 0.3–1.2)
BUN BLD-MCNC: 8 MG/DL (ref 9–23)
BUN/CREAT SERPL: 13.8 (ref 10–20)
CALCIUM BLD-MCNC: 9.2 MG/DL (ref 8.7–10.4)
CHLORIDE SERPL-SCNC: 106 MMOL/L (ref 98–112)
CHOLEST SERPL-MCNC: 116 MG/DL (ref ?–200)
CO2 SERPL-SCNC: 26 MMOL/L (ref 21–32)
CREAT BLD-MCNC: 0.58 MG/DL (ref 0.55–1.02)
DEPRECATED HBV CORE AB SER IA-ACNC: 84 NG/ML (ref 50–306)
DEPRECATED RDW RBC AUTO: 44.3 FL (ref 35.1–46.3)
EGFRCR SERPLBLD CKD-EPI 2021: 115 ML/MIN/1.73M2 (ref 60–?)
EOSINOPHIL # BLD AUTO: 0.11 X10(3) UL (ref 0–0.7)
EOSINOPHIL NFR BLD AUTO: 1.8 %
ERYTHROCYTE [DISTWIDTH] IN BLOOD BY AUTOMATED COUNT: 23.9 % (ref 11–15)
FASTING PATIENT LIPID ANSWER: YES
FASTING STATUS PATIENT QL REPORTED: YES
GLOBULIN PLAS-MCNC: 3.3 G/DL (ref 2–3.5)
GLUCOSE BLD-MCNC: 87 MG/DL (ref 70–99)
HCT VFR BLD AUTO: 32.3 % (ref 35–48)
HDLC SERPL-MCNC: 48 MG/DL (ref 40–59)
HGB BLD-MCNC: 9.5 G/DL (ref 12–16)
IMM GRANULOCYTES # BLD AUTO: 0.01 X10(3) UL (ref 0–1)
IMM GRANULOCYTES NFR BLD: 0.2 %
IRON SATN MFR SERPL: 45 % (ref 15–50)
IRON SERPL-MCNC: 120 UG/DL (ref 50–170)
LDLC SERPL CALC-MCNC: 53 MG/DL (ref ?–100)
LYMPHOCYTES # BLD AUTO: 1.63 X10(3) UL (ref 1–4)
LYMPHOCYTES NFR BLD AUTO: 26.6 %
MCH RBC QN AUTO: 16.9 PG (ref 26–34)
MCHC RBC AUTO-ENTMCNC: 29.4 G/DL (ref 31–37)
MCV RBC AUTO: 57.6 FL (ref 80–100)
MONOCYTES # BLD AUTO: 0.42 X10(3) UL (ref 0.1–1)
MONOCYTES NFR BLD AUTO: 6.9 %
NEUTROPHILS # BLD AUTO: 3.94 X10 (3) UL (ref 1.5–7.7)
NEUTROPHILS # BLD AUTO: 3.94 X10(3) UL (ref 1.5–7.7)
NEUTROPHILS NFR BLD AUTO: 64.2 %
NONHDLC SERPL-MCNC: 68 MG/DL (ref ?–130)
OSMOLALITY SERPL CALC.SUM OF ELEC: 288 MOSM/KG (ref 275–295)
PLATELET # BLD AUTO: 202 10(3)UL (ref 150–450)
PLATELET MORPHOLOGY: NORMAL
PLATELETS.RETICULATED NFR BLD AUTO: 5.9 % (ref 0–7)
POTASSIUM SERPL-SCNC: 4 MMOL/L (ref 3.5–5.1)
PROT SERPL-MCNC: 7.7 G/DL (ref 5.7–8.2)
RBC # BLD AUTO: 5.61 X10(6)UL (ref 3.8–5.3)
SODIUM SERPL-SCNC: 140 MMOL/L (ref 136–145)
TOTAL IRON BINDING CAPACITY: 264 UG/DL (ref 250–425)
TRANSFERRIN SERPL-MCNC: 182 MG/DL (ref 250–380)
TRIGL SERPL-MCNC: 75 MG/DL (ref 30–149)
VLDLC SERPL CALC-MCNC: 11 MG/DL (ref 0–30)
WBC # BLD AUTO: 6.1 X10(3) UL (ref 4–11)

## 2025-08-14 PROCEDURE — 80061 LIPID PANEL: CPT

## 2025-08-14 PROCEDURE — 84466 ASSAY OF TRANSFERRIN: CPT

## 2025-08-14 PROCEDURE — 85060 BLOOD SMEAR INTERPRETATION: CPT

## 2025-08-14 PROCEDURE — 83540 ASSAY OF IRON: CPT

## 2025-08-14 PROCEDURE — 80053 COMPREHEN METABOLIC PANEL: CPT

## 2025-08-14 PROCEDURE — 36415 COLL VENOUS BLD VENIPUNCTURE: CPT

## 2025-08-14 PROCEDURE — 82728 ASSAY OF FERRITIN: CPT

## 2025-08-14 PROCEDURE — 85025 COMPLETE CBC W/AUTO DIFF WBC: CPT

## 2025-08-16 ENCOUNTER — HOSPITAL ENCOUNTER (OUTPATIENT)
Dept: GENERAL RADIOLOGY | Age: 45
Discharge: HOME | End: 2025-08-16
Attending: PHYSICIAN ASSISTANT

## 2025-08-16 DIAGNOSIS — M54.9 BACK PAIN WITHOUT SCIATICA: ICD-10-CM

## 2025-08-16 PROCEDURE — 72110 X-RAY EXAM L-2 SPINE 4/>VWS: CPT | Performed by: PHYSICIAN ASSISTANT

## 2025-08-25 ENCOUNTER — HOSPITAL ENCOUNTER (OUTPATIENT)
Dept: ULTRASOUND IMAGING | Facility: HOSPITAL | Age: 45
Discharge: HOME | End: 2025-08-25
Attending: FAMILY MEDICINE

## 2025-08-25 ENCOUNTER — HOSPITAL ENCOUNTER (OUTPATIENT)
Dept: MAMMOGRAPHY | Facility: HOSPITAL | Age: 45
Discharge: HOME | End: 2025-08-25
Attending: FAMILY MEDICINE

## 2025-08-25 DIAGNOSIS — R92.8 ABNORMAL MAMMOGRAM: ICD-10-CM

## 2025-08-25 PROCEDURE — 77065 DX MAMMO INCL CAD UNI: CPT | Performed by: FAMILY MEDICINE

## 2025-08-25 PROCEDURE — 76642 ULTRASOUND BREAST LIMITED: CPT | Performed by: FAMILY MEDICINE

## 2025-08-25 PROCEDURE — 77061 BREAST TOMOSYNTHESIS UNI: CPT | Performed by: FAMILY MEDICINE

## (undated) DEVICE — GAMMEX® PI HYBRID SIZE 7, STERILE POWDER-FREE SURGICAL GLOVE, POLYISOPRENE AND NEOPRENE BLEND: Brand: GAMMEX

## (undated) DEVICE — SUTURE ETHILON 4-0 699G

## (undated) DEVICE — SOL  .9 1000ML BTL

## (undated) DEVICE — PLASTIC HAND: Brand: MEDLINE INDUSTRIES, INC.

## (undated) DEVICE — ZIMMER® STERILE DISPOSABLE TOURNIQUET CUFF WITH PLC, DUAL PORT, DUAL BLADDER, 18 IN. (46 CM)

## (undated) NOTE — LETTER
Patient Name: Carmen Guardado  : 1980  MRN: [id number]  Patient Address: [address] Disease 2019 (COVID-19)     [facility] is committed to the safety and well-being of our patients, mem carefully. If your symptoms get worse, call your healthcare provider immediately. 3. Get rest and stay hydrated.    4. If you have a medical appointment, call the healthcare provider ahead of time and tell them that you have or may have COVID-19.  5. For m of fever-reducing medications; and  · Improvement in respiratory symptoms (e.g., cough, shortness of breath); and  · At least 10 days have passed since symptoms first appeared OR if asymptomatic patient or date of symptom onset is unclear then use 10 days donors must:    · Have had a confirmed diagnosis of COVID-19  · Be symptom-free for at least 14 days*    *Some people will be required to have a repeat COVID-19 test in order to be eligible to donate.  If you’re instructed by Dave that a repeat test is r random. Researchers are trying to identify similarities between people with a Post-COVID condition to better understand if there are risk factors. How do I prevent a Post-COVID condition?   The best way to prevent the long-term symptoms of COVID-19 is

## (undated) NOTE — LETTER
Date & Time: 7/26/2023, 5:46 PM  Patient: Cristy Guardado  Encounter Provider(s):    SHAHRZAD Farfan       To Whom It May Concern:    Elba Walters was seen and treated in our department on 7/26/2023. She can return to work on 7/28/2023. If you have any questions or concerns, please do not hesitate to call.     JOHANN Salcido  Physician/APC Signature

## (undated) NOTE — MR AVS SNAPSHOT
Gretel Guardado   2017 11:45 AM   Office Visit   MRN:  [id number]    Description:  Female : 1980   Provider:  Marisa Ma   Department:  [facility]              Visit Summary      Primary Visit Diagnosis view more details from this visit by going to [email or web address]. If you've recently had a stay at the Hospital you can access your discharge instructions in [facility] by going to Visits < Admission Summaries.  If you've been to the Emergency Depar

## (undated) NOTE — MR AVS SNAPSHOT
After Visit Summary   2/28/2017    Samantha See    MRN: [id number]           Visit Information        Provider Department Dept Phone    2/28/2017  1:30  [address] [facility] [phone]      Your Vitals Were     BP Pulse T [email or web address]

## (undated) NOTE — LETTER
3/11/2024          To Whom It May Concern:    Gretel Guardado is currently under my medical care and may not return to work at this time.      She may return to work on 03/13/2024.  Activity is restricted as follows: none.    If you require additional information please contact our office.        Sincerely,    Allison Yepez PA-C          Document generated by:  Allison Yepez PA-C

## (undated) NOTE — LETTER
February 22, 2018    Patient: Luann Brewster   Date of Visit: 2/22/2018       To Whom It May Concern:    Luann Brewster was seen and treated in our emergency department on 2/22/2018. She should be excused from work today.     If you have any questions o

## (undated) NOTE — MR AVS SNAPSHOT
Gretel Guardado   2017 1:30 PM   Nurse Only   MRN:  [id number]    Description:  Female : 1980   Department:  [facility]              Visit Summary      Primary Visit Diagnosis     Vitamin B12 deficiency

## (undated) NOTE — MR AVS SNAPSHOT
Gretel Guardado   2017 11:00 AM   Nurse Only   MRN:  [id number]    Description:  Female : 1980   Department:  [facility] - Infusion              Visit Summary      Primary Visit Diagnosis     Vitamin B12 deficiency

## (undated) NOTE — MR AVS SNAPSHOT
Gretel Guardado   2017 1:00 PM   Nurse Only   MRN:  [id number]    Description:  Female : 1980   Department:  [facility] - Infusion              Visit Summary      Primary Visit Diagnosis     Vitamin B12 deficiency MyChart     Visit [employer]  You can access your MyChart to more actively manage your health care and view more details from this visit by going to [email or web address].   If you've recently had a stay at the Hospital you can access your discharge

## (undated) NOTE — LETTER
19      Patient: Tahmina Alvarenga  : 1980 Visit date: 3/21/2019    Dear Hermelindo Oliva,      I examined your patient in consultation today. She has a painful ganglion of the right palm, over the right ring finger ray.   We will plan on

## (undated) NOTE — MR AVS SNAPSHOT
Gretel Guardado   2017 12:00 PM   Nurse Only   MRN:  [id number]    Description:  Female : 1980   Department:  [facility] - Infusion              Visit Summary      Primary Visit Diagnosis     Vitamin B12 deficiency

## (undated) NOTE — ED AVS SNAPSHOT
Giorgi Griffin   MRN: [id number]    Department:  [facility] Emergency Department   Date of Visit:  2/22/2018           Disclosure     Insurance plans vary and the physician(s) referred by the ER may not be covered by your plan.  Please contact y CARE PHYSICIAN AT ONCE OR RETURN IMMEDIATELY TO THE EMERGENCY DEPARTMENT. If you have been prescribed any medication(s), please fill your prescription right away and begin taking the medication(s) as directed.   If you believe that any of the medications

## (undated) NOTE — LETTER
Date & Time: 8/1/2018, 4:14 PM  Patient: Tamra De Paz  Encounter Provider(s):    Courtney Arroyo MD       To Whom It May Concern:    Tamra De Paz was seen and treated in our department on 8/1/2018.  She should not return to work until 3-4 days from

## (undated) NOTE — LETTER
9/21/2023          To Whom It May Concern:    Evan Odell is currently under my medical care and may not return to work at this time. She may return to work on 09/23/2023. Activity is restricted as follows: none. If you require additional information please contact our office.         Sincerely,    Adriano Giles PA-C          Document generated by:  Adriano Giles PA-C

## (undated) NOTE — MR AVS SNAPSHOT
Gretel Guardado   1/10/2017 1:00 PM   Office Visit   MRN:  [id number]    Description:  Female : 1980   Provider:  Caden Cheatham   Department:  [facility] On[facility]              Visit Summary      Primary Visit Diagnosis Appointment with EM [facility] at [address] [place] [address] ([phone])   [address]   [facility] [address]            MyChart     Visit MyChart  You can access your MyChart to more actively manage your health care and view more de

## (undated) NOTE — MR AVS SNAPSHOT
Gretel Guardado   2017 1:30 PM   Nurse Only   MRN:  [id number]    Description:  Female : 1980   Department:  [facility] - Infusion              Visit Summary      Primary Visit Diagnosis     Vitamin B12 deficiency